# Patient Record
Sex: MALE | Race: WHITE | NOT HISPANIC OR LATINO | Employment: OTHER | URBAN - METROPOLITAN AREA
[De-identification: names, ages, dates, MRNs, and addresses within clinical notes are randomized per-mention and may not be internally consistent; named-entity substitution may affect disease eponyms.]

---

## 2018-07-21 ENCOUNTER — OFFICE VISIT (OUTPATIENT)
Dept: URGENT CARE | Facility: URGENT CARE | Age: 38
End: 2018-07-21
Payer: COMMERCIAL

## 2018-07-21 VITALS
DIASTOLIC BLOOD PRESSURE: 69 MMHG | HEART RATE: 78 BPM | HEIGHT: 74 IN | TEMPERATURE: 97.4 F | BODY MASS INDEX: 19.64 KG/M2 | SYSTOLIC BLOOD PRESSURE: 112 MMHG | WEIGHT: 153 LBS | OXYGEN SATURATION: 100 %

## 2018-07-21 DIAGNOSIS — M26.623 BILATERAL TEMPOROMANDIBULAR JOINT PAIN: Primary | ICD-10-CM

## 2018-07-21 PROCEDURE — 99202 OFFICE O/P NEW SF 15 MIN: CPT | Performed by: FAMILY MEDICINE

## 2018-07-21 NOTE — MR AVS SNAPSHOT
"              After Visit Summary   7/21/2018    Cameron Garza    MRN: [id number]           Patient Information     Date Of Birth          1980        Visit Information        Provider Department      7/21/2018 8:25 PM Jorge A Hernandez MD [facility]        Today's Diagnoses     Bilateral temporomandibular joint pain    -  1       Follow-ups after your visit        Who to contact     If you have questions or need follow up information about today's clinic visit or your schedule please contact [facility] directly at [phone].  Normal or non-critical lab and imaging results will be communicated to you by MyChart, letter or phone within 4 business days after the clinic has received the results. If you do not hear from us within 7 days, please contact the clinic through MyChart or phone. If you have a critical or abnormal lab result, we will notify you by phone as soon as possible.  Submit refill requests through [employer] or call your pharmacy and they will forward the refill request to us. Please allow 3 business days for your refill to be completed.          Additional Information About Your Visit        Care EveryWhere ID     This is your Care EveryWhere ID. This could be used by other organizations to access your [place] medical records  [id number]        Your Vitals Were     Pulse Temperature Height Pulse Oximetry BMI (Body Mass Index)       78 97.4  F (36.3  C) (Tympanic) 6' 2\" (1.88 m) 100% 19.64 kg/m2        Blood Pressure from Last 3 Encounters:   07/21/18 112/69    Weight from Last 3 Encounters:   07/21/18 153 lb (69.4 kg)              Today, you had the following     No orders found for display       Primary Care Provider Fax #    Physician No Ref-Primary [phone]       No address on file        Equal Access to Services     KAMLA NINA AH: Staci Higginbotham, alan najera, duncan rawls " lajody kohli. So [facility] [phone].    ATENCIÓN: Si habla español, tiene a dumas disposición servicios gratuitos de asistencia lingüística. Llame al [phone].    We comply with applicable federal civil rights laws and [place] laws. We do not discriminate on the basis of race, color, national origin, age, disability, sex, sexual orientation, or gender identity.            Thank you!     Thank you for choosing [facility]  for your care. Our goal is always to provide you with excellent care. Hearing back from our patients is one way we can continue to improve our services. Please take a few minutes to complete the written survey that you may receive in the mail after your visit with us. Thank you!             Your Updated Medication List - Protect others around you: Learn how to safely use, store and throw away your medicines at www.disposemymeds.org.      Notice  As of 7/21/2018  8:48 PM    You have not been prescribed any medications.

## 2018-07-22 NOTE — PROGRESS NOTES
Subjective: For most of the summer patient has been feeling pressure in his ears that waxes and wanes, worse after eating or drinking, never affects his hearing, does not have any congestion or allergy problems.  He does not drink caffeine, does not chew gum, but he is going through a divorce right now.  He works as a .  It is not terribly stressful.    Objective: ENT with totally normal TMs and throat and neck.  He does have a little discomfort over the TMJs on both sides    Assessment and plan: This is most likely TMJ related and it really does not bother him a lot, but he got worried that there might be something wrong with his ears.  We talked about some of the measures to make TMJ better and hopefully will settle down over time.

## 2019-01-03 ENCOUNTER — OFFICE VISIT (OUTPATIENT)
Dept: FAMILY MEDICINE | Facility: CLINIC | Age: 39
End: 2019-01-03
Payer: COMMERCIAL

## 2019-01-03 VITALS
TEMPERATURE: 97.5 F | OXYGEN SATURATION: 95 % | DIASTOLIC BLOOD PRESSURE: 59 MMHG | HEIGHT: 74 IN | WEIGHT: 147 LBS | HEART RATE: 85 BPM | SYSTOLIC BLOOD PRESSURE: 93 MMHG | BODY MASS INDEX: 18.87 KG/M2

## 2019-01-03 DIAGNOSIS — R05.9 COUGH: ICD-10-CM

## 2019-01-03 DIAGNOSIS — J06.9 UPPER RESPIRATORY TRACT INFECTION, UNSPECIFIED TYPE: Primary | ICD-10-CM

## 2019-01-03 PROCEDURE — 99213 OFFICE O/P EST LOW 20 MIN: CPT | Performed by: INTERNAL MEDICINE

## 2019-01-03 RX ORDER — PREDNISONE 20 MG/1
TABLET ORAL
Qty: 20 TABLET | Refills: 0 | Status: SHIPPED | OUTPATIENT
Start: 2019-01-03 | End: 2019-01-03

## 2019-01-03 RX ORDER — PREDNISONE 20 MG/1
TABLET ORAL
Qty: 20 TABLET | Refills: 0 | Status: SHIPPED | OUTPATIENT
Start: 2019-01-03 | End: 2019-03-04

## 2019-01-03 RX ORDER — AZITHROMYCIN 250 MG/1
TABLET, FILM COATED ORAL
Qty: 6 TABLET | Refills: 0 | Status: SHIPPED | OUTPATIENT
Start: 2019-01-03 | End: 2019-03-04

## 2019-01-03 RX ORDER — AZITHROMYCIN 250 MG/1
TABLET, FILM COATED ORAL
Qty: 6 TABLET | Refills: 0 | Status: SHIPPED | OUTPATIENT
Start: 2019-01-03 | End: 2019-01-03

## 2019-01-03 ASSESSMENT — MIFFLIN-ST. JEOR: SCORE: 1651.54

## 2019-01-03 NOTE — PROGRESS NOTES
SUBJECTIVE:   Cameron Garza is a 38 year old male who presents to clinic today for the following health issues:    Chief Complaint   Patient presents with     Cough     Slight yellow productive cough. Had the resp flu last week.  Last fever on Thursday. Night sweats all week., Ears ok, scratchy throat. Tried Lozenges             Current Medications:     Current Outpatient Medications   Medication Sig Dispense Refill     azithromycin (ZITHROMAX) 250 MG tablet Two tablets first day, then one tablet daily for four days. 6 tablet 0     predniSONE (DELTASONE) 20 MG tablet Take 60 mg by mouth daily for 3 days, THEN 40 mg daily for 3 days, THEN 20 mg daily for 3 days, THEN 10 mg daily for 3 days. 20 tablet 0         Allergies:      Allergies   Allergen Reactions     Amoxicillin Hives            Past Medical History:   No past medical history on file.      Past Surgical History:   No past surgical history on file.      Family Medical History:   No family history on file.      Social History:     Social History     Socioeconomic History     Marital status:      Spouse name: Not on file     Number of children: Not on file     Years of education: Not on file     Highest education level: Not on file   Social Needs     Financial resource strain: Not on file     Food insecurity - worry: Not on file     Food insecurity - inability: Not on file     Transportation needs - medical: Not on file     Transportation needs - non-medical: Not on file   Occupational History     Not on file   Tobacco Use     Smoking status: Never Smoker     Smokeless tobacco: Never Used   Substance and Sexual Activity     Alcohol use: Yes     Comment: rare      Drug use: No     Sexual activity: No   Other Topics Concern     Not on file   Social History Narrative     Not on file           Review of System:     Constitutional: Negative for fever or chills  Skin: Negative for rashes  Ears/Nose/Throat: Positive for nasal congestion, sore  "throat  Respiratory: positive for cough, URI symptoms  Cardiovascular: Negative for chest pain  Gastrointestinal: Negative for nausea, vomiting  Genitourinary: Negative for dysuria, hematuria  Musculoskeletal: Negative for myalgias  Neurologic: Negative for headaches  Psychiatric: Negative for depression, anxiety  Hematologic/Lymphatic/Immunologic: Negative  Endocrine: Negative  Behavioral: Negative for tobacco use       Physical Exam:   BP 93/59 (BP Location: Left arm, Patient Position: Chair, Cuff Size: Adult Regular)   Pulse 85   Temp 97.5  F (36.4  C) (Oral)   Ht 1.88 m (6' 2\")   Wt 66.7 kg (147 lb)   SpO2 95%   BMI 18.87 kg/m      GENERAL: alert and no distress  EYES: eyes grossly normal to inspection, and conjunctivae and sclerae normal  HENT: Normocephalic atraumatic. Nose and mouth without ulcers or lesions, nasal congestion present  NECK: supple  RESP: lungs clear to auscultation   CV: regular rate and rhythm, normal S1 S2  LYMPH: no peripheral edema   ABDOMEN: nondistended  MS: no gross musculoskeletal defects noted  SKIN: no suspicious lesions or rashes  NEURO: Alert & Oriented x 3.   PSYCH: mentation appears normal, affect normal        Diagnostic Test Results:     Diagnostic Test Results:  No results found for this or any previous visit.    ASSESSMENT/PLAN:     (R05) Cough  (J06.9) Upper respiratory tract infection, unspecified type  (primary encounter diagnosis)  Comment: recent URI with cough symptoms  Plan: azithromycin (ZITHROMAX) 250 MG tablet,         predniSONE (DELTASONE) 20 MG tablet    Follow Up Plan:     Patient is instructed to return to Internal Medicine clinic for follow-up visit in 1 week.        Shanon Hodges MD  Internal Medicine  [facility]    "

## 2019-01-04 ENCOUNTER — TELEPHONE (OUTPATIENT)
Dept: FAMILY MEDICINE | Facility: CLINIC | Age: 39
End: 2019-01-04

## 2019-01-04 NOTE — TELEPHONE ENCOUNTER
Pt just called back due to remembering quesiton from before.      Is the timing of the medication so specific?  Is it really every hour to the hour?

## 2019-01-04 NOTE — TELEPHONE ENCOUNTER
Pt is calling back about this medication.  He is very nervous and has been reading the side effects.  He is questioning immunity and whether he can travel or not?    Pt has forgotten the specifics of what questions he had.

## 2019-01-04 NOTE — TELEPHONE ENCOUNTER
Tried to call and speak with patient.      Left detailed message (permission given in first call into clinic):  1. Advised patient to contact his pharmacy regarding the Prednisone and how to take that med.  Whether it's OK to take 60mg once daily x 3 days (for ex)---or 20mg TID x 3 days??    2. Regarding travel to [place] to see his children----if patient has fever or still feeling too ill to travel, then he should probably not travel.  If patient decides not to travel and needs letter from Dr Hodges---advised patient to call back with that request.      Mary Jane ZAMORANO RN,BSN

## 2019-01-04 NOTE — TELEPHONE ENCOUNTER
Reason for Call:  Med question    Detailed comments: Pt has questions regarding predniSONE (DELTASONE) 20 MG tablet    He is wondering if he can take the 60MG at once or if he is supposed to split it up to 3x daily    He was also told to stay home but states he has court mandated  visitation with his children, is he okay to go? It is located in [place], if he is unable to go he will need a note from Dr. Hodges       Phone Number Patient can be reached at: Home number on file [phone] (home)    Best Time: any    Can we leave a detailed message on this number? YES    Call taken on 1/4/2019 at 8:34 AM by Livia Loo

## 2019-03-04 ENCOUNTER — OFFICE VISIT (OUTPATIENT)
Dept: FAMILY MEDICINE | Facility: CLINIC | Age: 39
End: 2019-03-04
Payer: COMMERCIAL

## 2019-03-04 ENCOUNTER — ANCILLARY PROCEDURE (OUTPATIENT)
Dept: GENERAL RADIOLOGY | Facility: CLINIC | Age: 39
End: 2019-03-04
Attending: INTERNAL MEDICINE
Payer: COMMERCIAL

## 2019-03-04 VITALS
BODY MASS INDEX: 20.95 KG/M2 | WEIGHT: 158.1 LBS | DIASTOLIC BLOOD PRESSURE: 72 MMHG | SYSTOLIC BLOOD PRESSURE: 111 MMHG | TEMPERATURE: 97.3 F | HEIGHT: 73 IN | HEART RATE: 67 BPM

## 2019-03-04 DIAGNOSIS — R05.9 COUGH: ICD-10-CM

## 2019-03-04 DIAGNOSIS — J06.9 UPPER RESPIRATORY TRACT INFECTION, UNSPECIFIED TYPE: ICD-10-CM

## 2019-03-04 DIAGNOSIS — J20.9 ACUTE BRONCHITIS, UNSPECIFIED ORGANISM: Primary | ICD-10-CM

## 2019-03-04 PROCEDURE — 71046 X-RAY EXAM CHEST 2 VIEWS: CPT

## 2019-03-04 PROCEDURE — 99214 OFFICE O/P EST MOD 30 MIN: CPT | Performed by: INTERNAL MEDICINE

## 2019-03-04 RX ORDER — PREDNISONE 20 MG/1
TABLET ORAL
Qty: 20 TABLET | Refills: 0 | Status: SHIPPED | OUTPATIENT
Start: 2019-03-04 | End: 2019-03-04

## 2019-03-04 RX ORDER — AZITHROMYCIN 250 MG/1
TABLET, FILM COATED ORAL
Qty: 6 TABLET | Refills: 1 | Status: SHIPPED | OUTPATIENT
Start: 2019-03-04 | End: 2019-05-09

## 2019-03-04 RX ORDER — PREDNISONE 20 MG/1
TABLET ORAL
Qty: 20 TABLET | Refills: 1 | Status: SHIPPED | OUTPATIENT
Start: 2019-03-04 | End: 2019-05-09

## 2019-03-04 ASSESSMENT — MIFFLIN-ST. JEOR: SCORE: 1686.02

## 2019-03-04 NOTE — PROGRESS NOTES
SUBJECTIVE:   Cameron Garza is a 38 year old male who presents to clinic today for the following health issues:      Follow up on recent Cough, bronchitis symptoms      HPI:   Patient Cameron Garza is a very pleasant 38 year old male with history of recent cough and bronchitis who presents to Internal Medicine clinic today for follow up on recent Cough, bronchitis symptoms. Regarding the patient's bronchitis symptoms, the patient denies any fever or chills. He is still complaining of intermittent dry coughing spells symptoms.        Current Medications:     Current Outpatient Medications   Medication Sig Dispense Refill     azithromycin (ZITHROMAX) 250 MG tablet Two tablets first day, then one tablet daily for four days. 6 tablet 0         Allergies:      Allergies   Allergen Reactions     Amoxicillin Hives            Past Medical History:   No past medical history on file.      Past Surgical History:   No past surgical history on file.      Family Medical History:   No family history on file.      Social History:     Social History     Socioeconomic History     Marital status:      Spouse name: Not on file     Number of children: Not on file     Years of education: Not on file     Highest education level: Not on file   Occupational History     Not on file   Social Needs     Financial resource strain: Not on file     Food insecurity:     Worry: Not on file     Inability: Not on file     Transportation needs:     Medical: Not on file     Non-medical: Not on file   Tobacco Use     Smoking status: Never Smoker     Smokeless tobacco: Never Used   Substance and Sexual Activity     Alcohol use: Yes     Comment: rare      Drug use: No     Sexual activity: No   Lifestyle     Physical activity:     Days per week: Not on file     Minutes per session: Not on file     Stress: Not on file   Relationships     Social connections:     Talks on phone: Not on file     Gets together: Not on file     Attends Buddhist service:  "Not on file     Active member of club or organization: Not on file     Attends meetings of clubs or organizations: Not on file     Relationship status: Not on file     Intimate partner violence:     Fear of current or ex partner: Not on file     Emotionally abused: Not on file     Physically abused: Not on file     Forced sexual activity: Not on file   Other Topics Concern     Not on file   Social History Narrative     Not on file           Review of System:     Constitutional: Negative for fever or chills  Skin: Negative for rashes  Ears/Nose/Throat: Negative for nasal congestion, sore throat  Respiratory: positive for cough  Cardiovascular: Negative for chest pain  Gastrointestinal: Negative for nausea, vomiting  Genitourinary: Negative for dysuria, hematuria  Musculoskeletal: Negative for myalgias  Neurologic: Negative for headaches  Psychiatric: Negative for depression, anxiety  Hematologic/Lymphatic/Immunologic: Negative  Endocrine: Negative  Behavioral: Negative for tobacco use       Physical Exam:   /72 (BP Location: Right arm, Cuff Size: Adult Regular)   Pulse 67   Temp 97.3  F (36.3  C) (Oral)   Ht 1.854 m (6' 1\")   Wt 71.7 kg (158 lb 1.6 oz)   BMI 20.86 kg/m      GENERAL: alert and no distress  EYES: eyes grossly normal to inspection, and conjunctivae and sclerae normal  HENT: Normocephalic atraumatic. Nose and mouth without ulcers or lesions  NECK: supple  RESP: intermittent dry coughing spells present  CV: regular rate and rhythm, normal S1 S2  LYMPH: no peripheral edema   ABDOMEN: nondistended  MS: no gross musculoskeletal defects noted  SKIN: no suspicious lesions or rashes  NEURO: Alert & Oriented x 3.   PSYCH: mentation appears normal, affect normal        Diagnostic Test Results:     XR CHEST 2 VW 3/4/2019 11:45 AM      HISTORY: cough, eval lungs; Cough     COMPARISON: None                                                                      IMPRESSION: The cardiac silhouette and " pulmonary vasculature are  within normal limits. The lungs are clear.     OTTO DENNEY MD    ASSESSMENT/PLAN:     (R05) Cough  (J06.9) Upper respiratory tract infection, unspecified type  (J20.9) Acute bronchitis, unspecified organism  (primary encounter diagnosis)  Comment: bronchitis with cough symptoms not fully resolved   Plan: XR Chest 2 Views is negative for pneumonia, predniSONE (DELTASONE) 20 MG         tablet, azithromycin (ZITHROMAX) 250 MG tablet          Follow Up Plan:     Patient is instructed to return to Internal Medicine clinic for follow-up visit in 1 week.        Shanon Hodges MD  Internal Medicine  [facility]

## 2019-05-09 ENCOUNTER — OFFICE VISIT (OUTPATIENT)
Dept: FAMILY MEDICINE | Facility: CLINIC | Age: 39
End: 2019-05-09
Payer: COMMERCIAL

## 2019-05-09 VITALS
BODY MASS INDEX: 20.54 KG/M2 | OXYGEN SATURATION: 98 % | HEART RATE: 84 BPM | DIASTOLIC BLOOD PRESSURE: 66 MMHG | TEMPERATURE: 97.4 F | WEIGHT: 155 LBS | HEIGHT: 73 IN | SYSTOLIC BLOOD PRESSURE: 106 MMHG

## 2019-05-09 DIAGNOSIS — B35.1 TOENAIL FUNGUS: ICD-10-CM

## 2019-05-09 DIAGNOSIS — L60.0 INGROWN TOENAIL OF LEFT FOOT: Primary | ICD-10-CM

## 2019-05-09 DIAGNOSIS — B35.3 TINEA PEDIS OF LEFT FOOT: ICD-10-CM

## 2019-05-09 PROCEDURE — 99213 OFFICE O/P EST LOW 20 MIN: CPT | Performed by: NURSE PRACTITIONER

## 2019-05-09 ASSESSMENT — MIFFLIN-ST. JEOR: SCORE: 1671.96

## 2019-05-09 NOTE — PROGRESS NOTES
"HPI      SUBJECTIVE:   Cameron Garza is a 38 year old male who presents to clinic today for the following   health issues:    Chief Complaint   Patient presents with     Toe Pain     left big toe pain, Possible ingrown toenail. Red and tender to touch. Past hx toenail injury        L great Toenail fell off 1 year ago.   Started with ingrown about that time  Now is painful, red and swollen  No drainage   No history of ingrown nails   Always has bad feet he reports. Has chronic ridges in nails that are very pronounced.   Has a chronic rash of feet as well       No past medical history on file.  No family history on file.  No past surgical history on file.  Social History     Tobacco Use     Smoking status: Never Smoker     Smokeless tobacco: Never Used   Substance Use Topics     Alcohol use: Yes     Comment: rare      No current outpatient medications on file.     Allergies   Allergen Reactions     Amoxicillin Hives       Reviewed and updated as needed this visit by clinical staff and provider     ROS  Detailed as above     /66 (BP Location: Left arm, Patient Position: Chair, Cuff Size: Adult Regular)   Pulse 84   Temp 97.4  F (36.3  C) (Tympanic)   Ht 1.854 m (6' 1\")   Wt 70.3 kg (155 lb)   SpO2 98%   BMI 20.45 kg/m     Physical Exam   Constitutional: He appears well-developed.   Pulmonary/Chest: Effort normal.   Neurological: He is alert.   Skin:   Left great toenial thick with many ridges. Partially ingrown of lateral aspect with adjacent erythema, swelling and tenderness.  PLantar L foot with patchy red/dry/excoriated rash   Psychiatric: He has a normal mood and affect. Judgment normal.       Assessment and Plan:       ICD-10-CM    1. Ingrown toenail of left foot L60.0 PODIATRY/FOOT & ANKLE SURGERY REFERRAL   2. Toenail fungus B35.1    3. Tinea pedis of left foot B35.3      Ingrown toenail with fungus. Has many ridges in nails. States his kids also have a lot of ridges in their nails. Instructed on " using vicks vaporub on nails for fungus. He will follow-up with podiatry for eval of ingrown nail. Use lotrimin or any  Athletes foot OTC treatment.       MITESH Duarte, CNP  [facility]

## 2019-05-17 ENCOUNTER — OFFICE VISIT (OUTPATIENT)
Dept: PODIATRY | Facility: CLINIC | Age: 39
End: 2019-05-17
Payer: COMMERCIAL

## 2019-05-17 VITALS
HEIGHT: 73 IN | WEIGHT: 155 LBS | DIASTOLIC BLOOD PRESSURE: 66 MMHG | SYSTOLIC BLOOD PRESSURE: 106 MMHG | BODY MASS INDEX: 20.54 KG/M2

## 2019-05-17 DIAGNOSIS — L60.3 ONYCHODYSTROPHY: Primary | ICD-10-CM

## 2019-05-17 DIAGNOSIS — B35.3 TINEA PEDIS OF BOTH FEET: ICD-10-CM

## 2019-05-17 DIAGNOSIS — L60.0 INGROWING NAIL: ICD-10-CM

## 2019-05-17 PROCEDURE — 99243 OFF/OP CNSLTJ NEW/EST LOW 30: CPT | Performed by: PODIATRIST

## 2019-05-17 ASSESSMENT — MIFFLIN-ST. JEOR: SCORE: 1671.96

## 2019-05-17 NOTE — PROGRESS NOTES
PATIENT HISTORY:  Cameron Garza is a 38 year old male who presents to clinic for L 1st toenail pain, minimal today.  Reports hx of recent nail injury, and nail has grown back thicker, discolored.  3 weeks of pain.  He has tried Vicks on the nail.  5-6/10 pain with pressure.  None at rest.      I was requested to see this patient for this issue by Aleah Alvarado CNP.      Review of Systems:  Patient denies fever, chills, rash, wound, stiffness, limping, numbness, weakness, heart burn, blood in stool, chest pain with activity, calf pain when walking, shortness of breath with activity, chronic cough, easy bleeding/bruising, swelling of ankles, excessive thirst, fatigue, depression, anxiety.       PAST MEDICAL HISTORY:  No pertinent past medical history.     PAST SURGICAL HISTORY:  No pertinent past surgical history.     MEDICATIONS: No current outpatient medications on file.     ALLERGIES:    Allergies   Allergen Reactions     Amoxicillin Hives        SOCIAL HISTORY:   Social History     Socioeconomic History     Marital status:      Spouse name: Not on file     Number of children: Not on file     Years of education: Not on file     Highest education level: Not on file   Occupational History     Not on file   Social Needs     Financial resource strain: Not on file     Food insecurity:     Worry: Not on file     Inability: Not on file     Transportation needs:     Medical: Not on file     Non-medical: Not on file   Tobacco Use     Smoking status: Never Smoker     Smokeless tobacco: Never Used   Substance and Sexual Activity     Alcohol use: Yes     Comment: rare      Drug use: No     Sexual activity: Never   Lifestyle     Physical activity:     Days per week: Not on file     Minutes per session: Not on file     Stress: Not on file   Relationships     Social connections:     Talks on phone: Not on file     Gets together: Not on file     Attends Restorationist service: Not on file     Active member of club or  "organization: Not on file     Attends meetings of clubs or organizations: Not on file     Relationship status: Not on file     Intimate partner violence:     Fear of current or ex partner: Not on file     Emotionally abused: Not on file     Physically abused: Not on file     Forced sexual activity: Not on file   Other Topics Concern     Not on file   Social History Narrative     Not on file        FAMILY HISTORY: No pertinent family history.     EXAM:Vitals: /66   Ht 1.854 m (6' 1\")   Wt 70.3 kg (155 lb)   BMI 20.45 kg/m    BMI= Body mass index is 20.45 kg/m .    General appearance: Patient is alert and fully cooperative with history & exam.  No sign of distress is noted during the visit.     Psychiatric: Affect is pleasant & appropriate.  Patient appears motivated to improve health.     Respiratory: Breathing is regular & unlabored while sitting.     HEENT: Hearing is intact to spoken word.  Speech is clear.  No gross evidence of visual impairment that would impact ambulation.     Dermatologic: L 1st toenail with discoloration, dystrophic changes.  Mild pain and incurvation at lateral border.  R 3rd toe with similar appearance (pt reports prior injury here as well).  Some mild thickening to lateral edge of R 5th toenail with small callus.  Skin is intact to both lower extremities without significant lesions, rash or abrasion.  No paronychia or evidence of soft tissue infection is noted.  Mild plantar foot skin scaling rash b/l.     Vascular: DP & PT pulses are intact & regular bilaterally.  No significant edema or varicosities noted.  CFT and skin temperature are normal to both lower extremities.     Neurologic: Lower extremity sensation is intact to light touch.  No evidence of weakness or contracture in the lower extremities.  No evidence of neuropathy.     Musculoskeletal: Adductovarus R 5th toe.  Patient is ambulatory without assistive device or brace.  No gross ankle deformity noted.  No foot or ankle " joint effusion is noted.     ASSESSMENT:   Onychodystrophy b/l  L hallux ingrowing nail  Tinea pedis.  Toe deformity     PLAN:  Reviewed patient's chart in epic.  Discussed condition and treatment options including pros and cons.    The potential causes and nature of an ingrown toenail were discussed with the patient.  We reviewed the natural history/prognosis of the condition and potential risks if no treatment is provided.      Treatment options discussed included conservative management (soaking of foot, adequate width shoes)  as well as surgical management (partial or total nail removal).  The pros and cons of both forms of treatment were reviewed.      After thorough discussion and answering all questions, the patient elected to try wider shoes, soaking.  Minimal pain today w/o infection.  Nail procedure offered, but pt deferred.     Nail changes likely permanent from trauma.  Fungus is possible.    Discussed causes of nail fungus.  Discussed treatment options with patient and explained that there isn't one treatment that is 100% effective.  Debridement is an option.  Discussed oral lamisil which is the most effective at about 70% but can have liver effects.  Discussed over the counter antifungal creams.  Explained that these are less effective and need to be applied twice a day for about 3-4 months.  Also talked about prescription topicals, which have similar efficacy.  Also discussed that if there was damage to the nail and the nail is now dystrophic that none of the above is going to change the nail.  Discussed that if it becomes more painful, we can remove the nail in clinic.      Lamisil AT otc advised for athlete's foot.    Handouts given.    F/u prn.       Cameron Barnhart DPM, FACFAS

## 2019-05-17 NOTE — PATIENT INSTRUCTIONS
Try Lamisil AT cream over the counter for athlete's foot    INGROWN TOENAIL  When a toenail is ingrown, it is curved and grows into the skin, usually at the nail borders (the sides of the nail). This  digging in  of the nail irritates the skin, often creating pain, redness, swelling, and warmth in the toe.  If an ingrown nail causes a break in the skin, bacteria may enter and cause an infection in the area, which is often marked by drainage and a foul odor. However, even if the toe isn t painful, red, swollen, or warm, a nail that curves downward into the skin can progress to an infection.  CAUSES  Causes of ingrown toenails include:  Heredity. In many people, the tendency for ingrown toenails is inherited.   Trauma. Sometimes an ingrown toenail is the result of trauma, such as stubbing your toe, having an object fall on your toe, or engaging in activities that involve repeated pressure on the toes, such as kicking or running.   Improper trimming. The most common cause of ingrown toenails is cutting your nails too short. This encourages the skin next to the nail to fold over the nail.   Improperly sized footwear. Ingrown toenails can result from wearing socks and shoes that are tight or short.   Nail Conditions. Ingrown toenails can be caused by nail problems, such as fungal infections or losing a nail due to trauma.   TREATMENT  Sometimes initial treatment for ingrown toenails can be safely performed at home. However, home treatment is strongly discouraged if an infection is suspected, or for those who have medical conditions that put feet at high risk, such as diabetes, nerve damage in the foot, or poor circulation.  Home care:  If you don t have an infection or any of the above medical conditions, you can soak your foot in room-temperature water (adding Epsom s salt may be recommended by your doctor), and gently massage the side of the nail fold to help reduce the inflammation.  Avoid attempting  bathroom  surgery.  Repeated cutting of the nail can cause the condition to worsen over time. If your symptoms fail to improve, it s time to see a foot and ankle surgeon.  Physician care:  After examining the toe, the foot and ankle surgeon will select the treatment best suited for you. If an infection is present, an oral antibiotic may be prescribed.  Sometimes a minor surgical procedure, often performed in the office, will ease the pain and remove the offending nail. After applying a local anesthetic, the doctor removes part of the nail s side border. Some nails may become ingrown again, requiring removal of the nail root.  Following the nail procedure, a light bandage will be applied. Most people experience very little pain after surgery and may resume normal activity the next day. If your surgeon has prescribed an oral antibiotic, be sure to take all the medication, even if your symptoms have improved.    PREVENTION  Many cases of ingrown toenails may be prevented by:  Proper trimming. Cut toenails in a fairly straight line, and don t cut them too short. You should be able to get your fingernail under the sides and end of the nail.   Well-fitted shoes and socks. Don t wear shoes that are short or tight in the toe area. Avoid shoes that are loose, because they too cause pressure on the toes, especially when running or walking briskly.     SOAKING INSTRUCTIONS   - Twice a day soak/wash the affected area in warm water (you may add Epsom salt) for 5 to 10 minutes.  - After soaks, pat the area dry and then allow to airdry for a few minutes.   - Apply antibiotic ointment to the area and cover with 2 x 2 gauze and paper tape or a band-aid.  - You may walk and pursue everyday activities as tolerated with either an open toe shoe or cut-out shoe as needed. Wear regular shoes if no pain is noted.  - Watch for any signs and symptoms of infection such as: redness, red streaks going up the foot/leg, swelling, pus or foul odor.          NAIL FUNGUS / ONYCHOMYCOSIS   Nail fungus is not a hygiene problem and will not likely lead to significant medical   problems. The nails may get thick causing pain and possibly local skin infection.   Treatments include debridement (trimming), oral antifungals, topical antifungals and complete removal of the nail. Most fungal nails are not treated.   Topicals such as tea tree oil can be helpful for surface fungus and may, at best, limit   progression. Over the counter creams (such as Lamisil) can also be used however, their effectiveness is also quite low.  Topical treatment with Pen lac is expensive and often not covered by insurance. Pen lac has an approximate 8% success rate. Topical therapy recommendations is to apply twice a day for at least 3-4 months as it takes 9 months for new nail to grow out.    Experts suggest soaking your feet for 15 to 20 minutes in a mixture of 1 cup vinegar to 4 cups warm water. Be sure to rinse well and pat your feet dry when you're done. You can soak your feet like this daily. But if your skin becomes irritated, try soaking only two to three times a week. Vicks VapoRub, as with vinegar, there have been no controlled clinical trials to assess the effectiveness of Vicks VapoRub on nail fungus, but there have been numerous anecdotal reports that it works. There's no consensus on how often to apply this product, so check with your doctor before using it on your nails.     Oral therapies include Sporanox and Lamisil. Oral therapies are also expensive and not very effective. Side effects such as liver disease are the main concern. Return of fungus is common even if the treatment worked.     Other Tips:  - Penlac nail medication apply daily x 4 months; remove old polish first day of each week  - Antifungal cream/powder (Zeasorb) - apply daily to feet and shoes x 2 months  - Clean shoes with Lysol or in washing machine every few weeks  - Rotate shoe gear; give them 24 hours to dry  out between days wearing them  - Clean pair of socks in morning, clean pair in afternoon if your feet sweat  - Shower shoes used in public showers/pools        ATHLETE'S FOOT (TINEA PEDIS)  It is a rash that is caused by a fungus (most commonly Dermatophytes) in the outer layer of skin on the foot or in the nails. It can occur between the toes or on the instep and heel areas of the feet. Fungus will grow in warm and moist environments. The fungus that causes athlete's foot is contagious and you can get it from touching someone who has it of walking around bare foot around swimming pools, gyms, saunas, communal baths and showers, fitness centers or locker rooms.     SYMPTOMS  The symptoms of athlete's foot are numerous and include; itching, burning or stinging between the toes or on the soles of the feet, blisters, cracked and peeling skin, excessive dryness or excessive scaling on the bottom or sides of the feet, redness and softness with breaking down of the skin, especially between the toes, foot odor and thick, crumbly nails. Older males or people who live in warm humid environments are more prone to get it but it can develop at any age.    TREATMENT OPTIONS  Typically it can be treated with antifungal creams, lotions, ointments, sprays, or gels.  Many are available over the counter, some are prescription. It is important that you use them long enough, typically 4 weeks, to clear the rash. If an infection is particularly bad, your provider may prescribe oral medication. It is important that you follow the directions for any medication carefully to prevent recurrence of the rash.    HOME TREATMENT  1.  Keep feet clean and dry  2.  Dry between your toes any time your feet become wet  3.  Wear socks that are made of cotton, wool, or fibers designed to wick moisture away  from skin. Nylon, lycra, and spandex tend to trap moisture.  4.  If you have blistering, you may soak your feet in Burow's solution (aluminum  acetate is active ingredient. Domeboro is a brand sold at [employer]. This is available over the counter.  5.  Treat shows with antifungal powder  6.  Tea Tree oil may help reduce symptoms but does not cure the rash.    PREVENTION  1.  Change socks or stockings regularly.  2.  Use talcum powder on your feet if they sweat a lot.  3.  Do not borrow shoes.  4.  Let shoes dry out for 24 hours before wearing them again.  5.  Treat shoes with fungal powder  6.  Wear shoes that are well ventilated such as leather shoes or sandals.  Avoid shoes  made of synthetic materials such as vinyl or rubber.  7.  Wear water proof sandals when you are in public areas such as locker rooms, pools, communal baths, showers, saunas, and fitness centers.    FYI: Call or seek medical attention IMMEDIATELY if:  - You develop a fever over 100.4F for more than 24 hrs.  - There is a discharge of pus from infected areas.  - Red streaks develop from the affected areas  - Increase in redness, warmth, pain, swelling, or tenderness in the affected areas.

## 2019-05-17 NOTE — LETTER
5/17/2019         RE: Cameron Garza  [address]  [facility] [address]        Dear Colleague,    Thank you for referring your patient, Cameron Garza, to the [facility]. Please see a copy of my visit note below.    PATIENT HISTORY:  Cameron Garza is a 38 year old male who presents to clinic for L 1st toenail pain, minimal today.  Reports hx of recent nail injury, and nail has grown back thicker, discolored.  3 weeks of pain.  He has tried Vicks on the nail.  5-6/10 pain with pressure.  None at rest.      I was requested to see this patient for this issue by Aleah Alvarado CNP.      Review of Systems:  Patient denies fever, chills, rash, wound, stiffness, limping, numbness, weakness, heart burn, blood in stool, chest pain with activity, calf pain when walking, shortness of breath with activity, chronic cough, easy bleeding/bruising, swelling of ankles, excessive thirst, fatigue, depression, anxiety.       PAST MEDICAL HISTORY:  No pertinent past medical history.     PAST SURGICAL HISTORY:  No pertinent past surgical history.     MEDICATIONS: No current outpatient medications on file.     ALLERGIES:    Allergies   Allergen Reactions     Amoxicillin Hives        SOCIAL HISTORY:   Social History     Socioeconomic History     Marital status:      Spouse name: Not on file     Number of children: Not on file     Years of education: Not on file     Highest education level: Not on file   Occupational History     Not on file   Social Needs     Financial resource strain: Not on file     Food insecurity:     Worry: Not on file     Inability: Not on file     Transportation needs:     Medical: Not on file     Non-medical: Not on file   Tobacco Use     Smoking status: Never Smoker     Smokeless tobacco: Never Used   Substance and Sexual Activity     Alcohol use: Yes     Comment: rare      Drug use: No     Sexual activity: Never   Lifestyle     Physical activity:     Days per week: Not on file      "Minutes per session: Not on file     Stress: Not on file   Relationships     Social connections:     Talks on phone: Not on file     Gets together: Not on file     Attends Yazdanism service: Not on file     Active member of club or organization: Not on file     Attends meetings of clubs or organizations: Not on file     Relationship status: Not on file     Intimate partner violence:     Fear of current or ex partner: Not on file     Emotionally abused: Not on file     Physically abused: Not on file     Forced sexual activity: Not on file   Other Topics Concern     Not on file   Social History Narrative     Not on file        FAMILY HISTORY: No pertinent family history.     EXAM:Vitals: /66   Ht 1.854 m (6' 1\")   Wt 70.3 kg (155 lb)   BMI 20.45 kg/m     BMI= Body mass index is 20.45 kg/m .    General appearance: Patient is alert and fully cooperative with history & exam.  No sign of distress is noted during the visit.     Psychiatric: Affect is pleasant & appropriate.  Patient appears motivated to improve health.     Respiratory: Breathing is regular & unlabored while sitting.     HEENT: Hearing is intact to spoken word.  Speech is clear.  No gross evidence of visual impairment that would impact ambulation.     Dermatologic: L 1st toenail with discoloration, dystrophic changes.  Mild pain and incurvation at lateral border.  R 3rd toe with similar appearance (pt reports prior injury here as well).  Some mild thickening to lateral edge of R 5th toenail with small callus.  Skin is intact to both lower extremities without significant lesions, rash or abrasion.  No paronychia or evidence of soft tissue infection is noted.  Mild plantar foot skin scaling rash b/l.     Vascular: DP & PT pulses are intact & regular bilaterally.  No significant edema or varicosities noted.  CFT and skin temperature are normal to both lower extremities.     Neurologic: Lower extremity sensation is intact to light touch.  No evidence " of weakness or contracture in the lower extremities.  No evidence of neuropathy.     Musculoskeletal: Adductovarus R 5th toe.  Patient is ambulatory without assistive device or brace.  No gross ankle deformity noted.  No foot or ankle joint effusion is noted.     ASSESSMENT:   Onychodystrophy b/l  L hallux ingrowing nail  Tinea pedis.  Toe deformity     PLAN:  Reviewed patient's chart in epic.  Discussed condition and treatment options including pros and cons.    The potential causes and nature of an ingrown toenail were discussed with the patient.  We reviewed the natural history/prognosis of the condition and potential risks if no treatment is provided.      Treatment options discussed included conservative management (soaking of foot, adequate width shoes)  as well as surgical management (partial or total nail removal).  The pros and cons of both forms of treatment were reviewed.      After thorough discussion and answering all questions, the patient elected to try wider shoes, soaking.  Minimal pain today w/o infection.  Nail procedure offered, but pt deferred.     Nail changes likely permanent from trauma.  Fungus is possible.    Discussed causes of nail fungus.  Discussed treatment options with patient and explained that there isn't one treatment that is 100% effective.  Debridement is an option.  Discussed oral lamisil which is the most effective at about 70% but can have liver effects.  Discussed over the counter antifungal creams.  Explained that these are less effective and need to be applied twice a day for about 3-4 months.  Also talked about prescription topicals, which have similar efficacy.  Also discussed that if there was damage to the nail and the nail is now dystrophic that none of the above is going to change the nail.  Discussed that if it becomes more painful, we can remove the nail in clinic.      Lamisil AT otc advised for athlete's foot.    Handouts given.    F/u prn.       Cameron ROSA  RADHA Barnhart, FACFAS          Again, thank you for allowing me to participate in the care of your patient.        Sincerely,        Cameron Barnhart DPM

## 2019-09-07 ENCOUNTER — OFFICE VISIT (OUTPATIENT)
Dept: URGENT CARE | Facility: URGENT CARE | Age: 39
End: 2019-09-07
Payer: COMMERCIAL

## 2019-09-07 VITALS
SYSTOLIC BLOOD PRESSURE: 117 MMHG | BODY MASS INDEX: 20.45 KG/M2 | WEIGHT: 155 LBS | TEMPERATURE: 97.8 F | OXYGEN SATURATION: 100 % | DIASTOLIC BLOOD PRESSURE: 72 MMHG | HEART RATE: 67 BPM

## 2019-09-07 DIAGNOSIS — Z11.3 SCREEN FOR STD (SEXUALLY TRANSMITTED DISEASE): Primary | ICD-10-CM

## 2019-09-07 PROCEDURE — 99213 OFFICE O/P EST LOW 20 MIN: CPT | Performed by: NURSE PRACTITIONER

## 2019-09-07 PROCEDURE — 87491 CHLMYD TRACH DNA AMP PROBE: CPT | Performed by: NURSE PRACTITIONER

## 2019-09-07 PROCEDURE — 87591 N.GONORRHOEAE DNA AMP PROB: CPT | Performed by: NURSE PRACTITIONER

## 2019-09-07 ASSESSMENT — ENCOUNTER SYMPTOMS
RESPIRATORY NEGATIVE: 1
CARDIOVASCULAR NEGATIVE: 1
GASTROINTESTINAL NEGATIVE: 1
CONSTITUTIONAL NEGATIVE: 1

## 2019-09-07 NOTE — PROGRESS NOTES
HPI  Cameron Garza 38 year old presents to the urgent care requesting gonorrhea and Chlamydia testing.  He states that his soon-to-be ex-wife has made accusations regarding sexually transmitted infections in his children and on the advice of his  has come for testing.  He states he has not been sexually active and has no genuine concerns that he is contacted an STI.    History reviewed. No pertinent past medical history.   There is no problem list on file for this patient.   History reviewed. No pertinent surgical history.   Allergies   Allergen Reactions     Amoxicillin Hives         Review of Systems   Constitutional: Negative.    Respiratory: Negative.    Cardiovascular: Negative.    Gastrointestinal: Negative.    Genitourinary: Negative.          Physical Exam   Constitutional:   /72   Pulse 67   Temp 97.8  F (36.6  C) (Oral)   Wt 70.3 kg (155 lb)   SpO2 100%   BMI 20.45 kg/m       Cardiovascular: Normal rate.   Pulmonary/Chest: Effort normal.   Genitourinary:   Genitourinary Comments: Negative for symptoms of an STI   Nursing note and vitals reviewed.    Assessment:  1. Screen for STD (sexually transmitted disease)        Plan:  Results will be available in [facility] in 2 days.   Call if you have questions/concerns.   Viky Gore, DNP, APRN, CNP

## 2019-09-10 LAB
C TRACH DNA SPEC QL NAA+PROBE: NEGATIVE
N GONORRHOEA DNA SPEC QL NAA+PROBE: NEGATIVE
SPECIMEN SOURCE: NORMAL
SPECIMEN SOURCE: NORMAL

## 2019-10-17 ENCOUNTER — OFFICE VISIT (OUTPATIENT)
Dept: FAMILY MEDICINE | Facility: CLINIC | Age: 39
End: 2019-10-17
Payer: COMMERCIAL

## 2019-10-17 VITALS
TEMPERATURE: 97 F | DIASTOLIC BLOOD PRESSURE: 59 MMHG | BODY MASS INDEX: 20.41 KG/M2 | OXYGEN SATURATION: 99 % | WEIGHT: 154 LBS | SYSTOLIC BLOOD PRESSURE: 104 MMHG | HEIGHT: 73 IN | HEART RATE: 63 BPM

## 2019-10-17 DIAGNOSIS — D64.9 ANEMIA, UNSPECIFIED TYPE: ICD-10-CM

## 2019-10-17 DIAGNOSIS — Z13.29 SCREENING FOR THYROID DISORDER: ICD-10-CM

## 2019-10-17 DIAGNOSIS — F41.9 ANXIETY: ICD-10-CM

## 2019-10-17 DIAGNOSIS — Z80.0 FAMILY HISTORY OF COLON CANCER: ICD-10-CM

## 2019-10-17 DIAGNOSIS — R79.89 ELEVATED SERUM CREATININE: Primary | ICD-10-CM

## 2019-10-17 DIAGNOSIS — Z13.220 SCREENING FOR LIPID DISORDERS: ICD-10-CM

## 2019-10-17 DIAGNOSIS — R20.2 PARESTHESIA: ICD-10-CM

## 2019-10-17 DIAGNOSIS — K11.1 PAROTID GLAND ENLARGEMENT: ICD-10-CM

## 2019-10-17 LAB
ERYTHROCYTE [DISTWIDTH] IN BLOOD BY AUTOMATED COUNT: 13.2 % (ref 10–15)
HCT VFR BLD AUTO: 42.1 % (ref 40–53)
HGB BLD-MCNC: 14.1 G/DL (ref 13.3–17.7)
MCH RBC QN AUTO: 29.9 PG (ref 26.5–33)
MCHC RBC AUTO-ENTMCNC: 33.5 G/DL (ref 31.5–36.5)
MCV RBC AUTO: 89 FL (ref 78–100)
PLATELET # BLD AUTO: 190 10E9/L (ref 150–450)
RBC # BLD AUTO: 4.72 10E12/L (ref 4.4–5.9)
VIT B12 SERPL-MCNC: 507 PG/ML (ref 193–986)
WBC # BLD AUTO: 5.2 10E9/L (ref 4–11)

## 2019-10-17 PROCEDURE — 85027 COMPLETE CBC AUTOMATED: CPT | Performed by: INTERNAL MEDICINE

## 2019-10-17 PROCEDURE — 82607 VITAMIN B-12: CPT | Performed by: INTERNAL MEDICINE

## 2019-10-17 PROCEDURE — 99214 OFFICE O/P EST MOD 30 MIN: CPT | Performed by: INTERNAL MEDICINE

## 2019-10-17 PROCEDURE — 84443 ASSAY THYROID STIM HORMONE: CPT | Performed by: INTERNAL MEDICINE

## 2019-10-17 PROCEDURE — 82728 ASSAY OF FERRITIN: CPT | Performed by: INTERNAL MEDICINE

## 2019-10-17 PROCEDURE — 36415 COLL VENOUS BLD VENIPUNCTURE: CPT | Performed by: INTERNAL MEDICINE

## 2019-10-17 PROCEDURE — 80048 BASIC METABOLIC PNL TOTAL CA: CPT | Performed by: INTERNAL MEDICINE

## 2019-10-17 ASSESSMENT — MIFFLIN-ST. JEOR: SCORE: 1667.42

## 2019-10-17 NOTE — PROGRESS NOTES
Subjective     Cameron Garza is a 38 year old male who presents to clinic today for the following health issues:    HPI   ED/UC Followup:    Facility:  [facility]  Date of visit: 09/23/2019  Reason for visit:     Paresthesias (Primary Dx);   Anxiety;   Elevated serum creatinine    Current Status: patient states he is doing a lot better since discharge. Patient is asking for some blood work done as well.     Patient is here for ED follow-up   Was seen on 09/23/2019 for paraesthesias and anxiety  He woke up in middle of night with shakiness and tingling in arms  He also had headache and body was sore  This has happened 3 times  This is new issue  Only noticed these symptoms for last 2-3 months  Each time has same symptoms  Has not had any symptoms for last 2-3 weeks  Never been on anxiety medications in past  Never had cardiac work up done  Denies family history of anxiety and depression  Denies palpitations    Elevated creatinine  He was also found to have elevated creatinine in ED  There was chance he was dehydrated during this time  Would like to recheck labs today    Family history of colon cancer  Mother was diagnosed with colon cancer in 2010  She was 60 when she was diagnosed  Was told he needs colonoscopy at age 39  He will be due for one after 12/13/2019    Medications and Labs reviewed in EPIC    Reviewed and updated as needed this visit by Provider         Review of Systems   ROS COMP: Constitutional, HEENT, cardiovascular, pulmonary, GI, , musculoskeletal, neuro, skin, endocrine and psych systems are negative, except as otherwise noted.    This document serves as a record of the services and decisions personally performed and made by Britany Meadows MD. It was created on her behalf by Aimee Rahman, a trained medical scribe. The creation of this document is based on the provider's statements to the medical scribe.  Aimee Rahman 4:05 PM October 17, 2019        Objective    /59 (BP  "Location: Right arm, Patient Position: Sitting, Cuff Size: Adult Regular)   Pulse 63   Temp 97  F (36.1  C) (Tympanic)   Ht 1.854 m (6' 1\")   Wt 69.9 kg (154 lb)   SpO2 99%   BMI 20.32 kg/m    Body mass index is 20.32 kg/m .  Physical Exam   GENERAL APPEARANCE: healthy, alert and no distress  EYES: Eyes grossly normal to inspection, PERRL and conjunctivae and sclerae normal  HENT: ear canals and TM's normal and nose and mouth without ulcers or lesions  NECK: enlarged parotid glands bilaterally, no adenopathy  RESP: lungs clear to auscultation - no rales, rhonchi or wheezes  CV: regular rates and rhythm, normal S1 S2, no S3  PSYCH: mentation appears normal, affect normal/bright    Diagnostic Test Results:  Labs reviewed in Epic  Results for orders placed or performed in visit on 10/17/19 (from the past 24 hour(s))   CBC with platelets   Result Value Ref Range    WBC 5.2 4.0 - 11.0 10e9/L    RBC Count 4.72 4.4 - 5.9 10e12/L    Hemoglobin 14.1 13.3 - 17.7 g/dL    Hematocrit 42.1 40.0 - 53.0 %    MCV 89 78 - 100 fl    MCH 29.9 26.5 - 33.0 pg    MCHC 33.5 31.5 - 36.5 g/dL    RDW 13.2 10.0 - 15.0 %    Platelet Count 190 150 - 450 10e9/L   Vitamin B12   Result Value Ref Range    Vitamin B12 507 193 - 986 pg/mL         Reviewed and discussed BMP done on 09/24/2016  Reviewed and discussed lipids done on 09/24/2016  Reviewed and discussed TSH done on 09/24/2016  Reviewed and discussed liver labs done on 06/27/2011  Reviewed and discussed BMP done on 09/23/2019  Reviewed and discussed CBC done on 09/23/2019    Assessment & Plan   Cameron was seen today for er f/u.    Diagnoses and all orders for this visit:    Elevated serum creatinine  Patient's creatinine levels were elevated in ED  Could be due to dehydration  Levels were fine previously  He's requesting to recheck levels today   -     Basic metabolic panel    Anemia, unspecified type  -     CBC with platelets  -     Ferritin  -     Vitamin B12  Mild was detected in " ED.     Parotid gland enlargement  Enlarged parotid glands bilaterally noted one exam   Per patient report this is chronic     Anxiety  Patient was seen in ED on 09/23/2019 for anxiety and paraesthesia   Reports he woke up shaking and had tingling sensation  Also had general body soreness and headache  Started noticing these symptoms 2-3 months ago  Has had 3 episodes so far  Never tried medications in past  Never had cardiac work up  Explained there are medications he can take for anxiety  He is not interested in medication as symptoms are under control  Declined for echo as well  Let me know if symptoms get worse  Stay well hydrated  Avoid excessive caffeine  Avoid any decongestant  Stay well hydrated     Paresthesia  See above    Family history of colon cancer  Patient has family history of colon cancer in mother  She was diagnosed in 2010  He will need colonoscopy after 12/13/2019  Comments:  mother   Advised to make appointment with your PCP for physical and they can order colonoscopy for you once you turn 39    Screening for thyroid disorder  -     TSH with free T4 reflex    Screening for lipid disorders  -     Lipid panel reflex to direct LDL Non-fasting; Future    Patient declined flu shot today  Declined for HIV test     Patient will be due for physical per his report.  Patient Instructions   Labs today  Schedule an appointment for physical   Follow-up in 12/2019  Seek sooner medical attention if there is any worsening of symptoms or problems    The information in this document, created by the medical scribe for me, accurately reflects the services I personally performed and the decisions made by me. I have reviewed and approved this document for accuracy prior to leaving the patient care area.  October 17, 2019 4:26 PM    Britany Meadows MD  [facility]

## 2019-10-17 NOTE — PATIENT INSTRUCTIONS
Labs today  Schedule an appointment for physical  Follow-up in 12/2019 with PCP  Seek sooner medical attention if there is any worsening of symptoms or problems

## 2019-10-18 LAB
ANION GAP SERPL CALCULATED.3IONS-SCNC: 4 MMOL/L (ref 3–14)
BUN SERPL-MCNC: 16 MG/DL (ref 7–30)
CALCIUM SERPL-MCNC: 8.8 MG/DL (ref 8.5–10.1)
CHLORIDE SERPL-SCNC: 104 MMOL/L (ref 94–109)
CO2 SERPL-SCNC: 29 MMOL/L (ref 20–32)
CREAT SERPL-MCNC: 1.09 MG/DL (ref 0.66–1.25)
FERRITIN SERPL-MCNC: 68 NG/ML (ref 26–388)
GFR SERPL CREATININE-BSD FRML MDRD: 85 ML/MIN/{1.73_M2}
GLUCOSE SERPL-MCNC: 97 MG/DL (ref 70–99)
POTASSIUM SERPL-SCNC: 4.7 MMOL/L (ref 3.4–5.3)
SODIUM SERPL-SCNC: 137 MMOL/L (ref 133–144)
TSH SERPL DL<=0.005 MIU/L-ACNC: 1.49 MU/L (ref 0.4–4)

## 2019-10-18 NOTE — RESULT ENCOUNTER NOTE
Kristen Barnes,    This is to inform you regarding your test result.    Vitamin B 12 level is normal.  CBC result which includes white count Hemoglobin and  Platelet Counts is normal.       Sincerely,      Dr.Nasima Abdiel MD,FACP

## 2019-10-18 NOTE — RESULT ENCOUNTER NOTE
Kristen Barnes,    This is to inform you regarding your test result.    TSH which is thyroid hormone is normal.  Basic metabolic panel which includes electrolytes and kidney fucntion is normal.  Glucose which is your blood sugar is normal.  All your lab results are good      Sincerely,      Dr.Nasima Abdiel MD,FACP

## 2019-10-18 NOTE — RESULT ENCOUNTER NOTE
Kristen Barnes,    This is to inform you regarding your test result.    Ferritin which is iron stores in the body is normal.   Other results are peding.      Sincerely,      Dr.Nasima Abdiel MD,FACP

## 2020-01-09 ENCOUNTER — HOSPITAL ENCOUNTER (EMERGENCY)
Facility: CLINIC | Age: 40
Discharge: HOME | End: 2020-01-09
Attending: EMERGENCY MEDICINE | Admitting: EMERGENCY MEDICINE
Payer: COMMERCIAL

## 2020-01-09 VITALS
HEIGHT: 73 IN | DIASTOLIC BLOOD PRESSURE: 66 MMHG | SYSTOLIC BLOOD PRESSURE: 124 MMHG | RESPIRATION RATE: 13 BRPM | HEART RATE: 79 BPM | BODY MASS INDEX: 20.15 KG/M2 | OXYGEN SATURATION: 100 % | TEMPERATURE: 97.9 F | WEIGHT: 152 LBS

## 2020-01-09 DIAGNOSIS — H10.31 ACUTE CONJUNCTIVITIS OF RIGHT EYE, UNSPECIFIED ACUTE CONJUNCTIVITIS TYPE: ICD-10-CM

## 2020-01-09 PROCEDURE — 25000125 ZZHC RX 250

## 2020-01-09 PROCEDURE — 99283 EMERGENCY DEPT VISIT LOW MDM: CPT

## 2020-01-09 RX ORDER — CIPROFLOXACIN HYDROCHLORIDE 3.5 MG/ML
1-2 SOLUTION/ DROPS TOPICAL
Qty: 8.4 ML | Refills: 0 | Status: SHIPPED | OUTPATIENT
Start: 2020-01-09 | End: 2020-06-23

## 2020-01-09 RX ORDER — PROPARACAINE HYDROCHLORIDE 5 MG/ML
SOLUTION/ DROPS OPHTHALMIC
Status: COMPLETED
Start: 2020-01-09 | End: 2020-01-09

## 2020-01-09 RX ORDER — CIPROFLOXACIN HYDROCHLORIDE 3.5 MG/ML
1-2 SOLUTION/ DROPS TOPICAL EVERY 4 HOURS
Qty: 3.6 ML | Refills: 0 | Status: SHIPPED | OUTPATIENT
Start: 2020-01-09 | End: 2020-06-23

## 2020-01-09 RX ADMIN — FLUORESCEIN SODIUM: 1 STRIP OPHTHALMIC at 04:41

## 2020-01-09 RX ADMIN — PROPARACAINE HYDROCHLORIDE 1 DROP: 5 SOLUTION/ DROPS OPHTHALMIC at 04:41

## 2020-01-09 ASSESSMENT — MIFFLIN-ST. JEOR: SCORE: 1653.35

## 2020-01-09 ASSESSMENT — ENCOUNTER SYMPTOMS
COUGH: 1
PHOTOPHOBIA: 1
EYE DISCHARGE: 1
EYE REDNESS: 1
EYE PAIN: 1

## 2020-01-09 NOTE — ED PROVIDER NOTES
"  History     Chief Complaint:    Conjunctivitis    The history is provided by the patient.      Cameron Garza is a 39 year old male who presents with bilateral conjunctivitis that began 2 days ago. The patient reports eye pain, eye redness, eye discharge, and photophobia. He also reports nasal congestion and a cough. The patient notes that his son had pink eye last week. The patient was encouraged to come to the ED after calling the nurse line.    Allergies:  Amoxicillin     Medications:    The patient is currently on no regular medications.    Past Medical History:    Parotid gland enlargement  Anxiety  Paresthesia  Anemia   OCD  Allergic rhinitis  Panic disorder without agoraphobia    Past Surgical History:    Tonsillectomy   [place] teeth extraction    Family History:    Colon cancer - mother  Neuropathy - sister  Rectal cancer - mother    Social History:  Negative for tobacco use.  Positive for alcohol use - rarely.  Negative for drug use.  Patient accompanied to the ED by his 3 children.  Marital Status:   [2]     Review of Systems   HENT: Positive for congestion (nasal).    Eyes: Positive for photophobia, pain, discharge and redness.   Respiratory: Positive for cough.    All other systems reviewed and are negative.        Physical Exam     Patient Vitals for the past 24 hrs:   BP Temp Temp src Pulse Heart Rate Resp SpO2 Height Weight   01/09/20 0354 124/66 97.9  F (36.6  C) Oral 79 79 13 100 % 1.854 m (6' 1\") 68.9 kg (152 lb)     Physical Exam  General: Patient is alert and normal appearing.  HEENT: Head atraumatic    Eyes: pupils equal and reactive.  Conjunctival injection noted on examination.  On fluorescein dye Woods lamp examination no evidence of corneal abrasion or ulceration.  No periorbital warmth or erythema.  Pupil is equal and reactive to light and extraocular movements are intact with no diplopia.   Nares: patent   Oropharynx: no lesions, uvula midline, no palatal draping, normal voice, no " trismus  Neck: Supple without lymphadenopathy, no meningismus  Chest: Heart regular rate and rhythm.   Lungs: Equal clear to auscultation with no wheeze or rales  Abdomen: Soft, non tender, nondistended, normal bowel sounds  Back: No costovertebral angle tenderness, no midline C, T or L spine tenderness  Neuro: Grossly nonfocal, normal speech, strength equal bilaterally, CN 2-12 intact  Extremities: No deformities, equal radial and DP pulses. No clubbing, cyanosis.  No edema  Skin: Warm and dry with no rash.       Emergency Department Course     Interventions:  0441: Alcaine 1 drop  0441: Ful-joanie 1 mg ophthalmic strip     Emergency Department Course:  Past medical records, nursing notes, and vitals reviewed.    0401: I performed an exam of the patient as documented above.     0422: I rechecked the patient and discussed the results of his workup thus far.     I personally reviewed the results with the Patient and his 3 children and answered all related questions prior to discharge.     Findings and plan explained to the Patient and his 3 children. Patient discharged home with instructions regarding supportive care, medications, and reasons to return. The importance of close follow-up was reviewed.     Impression & Plan     Medical Decision Making:  Cameron Garza is a 39 year old male who presents for evaluation of a red eye.  A broad differential diagnosis was considered including bacterial conjunctivitis, viral conjunctivitis, foreign body, corneal abrasion, chemical vs allergic conjunctivitis, corneal ulcer, HSV, herpes zoster opthalmicus, endopthalmitis, orbital cellulitis, etc.  Signs and symptoms consistent with a conjunctivitis, likely bacterial. Will start antibiotics and have close follow-up of eye physician.  No red flag symptoms to suggest any of the above worrisome etiologies.  No dendrite or ulcer seen on Woods lamp examination.           Diagnosis:    ICD-10-CM   1. Acute conjunctivitis of right eye,  unspecified acute conjunctivitis type H10.31     Disposition:  Discharged to home.    Discharge Medications:  New Prescriptions    CIPROFLOXACIN (CILOXAN) 0.3 % OPHTHALMIC SOLUTION    Place 1-2 drops into the right eye every 2 hours for 7 days    CIPROFLOXACIN (CILOXAN) 0.3 % OPHTHALMIC SOLUTION    Place 1-2 drops into the right eye every 4 hours for 6 days     Scribe Disclosure:  Gabriella RECIO, am serving as a scribe at 4:07 AM on 1/9/2020 to document services personally performed by Kinza Baumann MD based on my observations and the provider's statements to me.     Gabriella Dial  1/9/2020    EMERGENCY DEPARTMENT       Kinza Baumann MD  01/09/20 0509

## 2020-01-09 NOTE — ED AVS SNAPSHOT
Emergency Department  [address] [place] [id number]  Phone:  [phone]  Fax:  [phone]                                    Cameron Garza   MRN: [id number]    Department:   Emergency Department   Date of Visit:  1/9/2020           After Visit Summary Signature Page    I have received my discharge instructions, and my questions have been answered. I have discussed any challenges I see with this plan with the nurse or doctor.    ..........................................................................................................................................  Patient/Patient Representative Signature      ..........................................................................................................................................  Patient Representative Print Name and Relationship to Patient    ..................................................               ................................................  Date                                   Time    ..........................................................................................................................................  Reviewed by Signature/Title    ...................................................              ..............................................  Date                                               Time          22EPIC Rev 08/18

## 2020-03-11 ENCOUNTER — HEALTH MAINTENANCE LETTER (OUTPATIENT)
Age: 40
End: 2020-03-11

## 2020-06-22 ENCOUNTER — NURSE TRIAGE (OUTPATIENT)
Dept: NURSING | Facility: CLINIC | Age: 40
End: 2020-06-22

## 2020-06-23 ENCOUNTER — VIRTUAL VISIT (OUTPATIENT)
Dept: FAMILY MEDICINE | Facility: CLINIC | Age: 40
End: 2020-06-23
Payer: COMMERCIAL

## 2020-06-23 DIAGNOSIS — Z20.822 EXPOSURE TO COVID-19 VIRUS: Primary | ICD-10-CM

## 2020-06-23 DIAGNOSIS — R68.83 CHILLS: ICD-10-CM

## 2020-06-23 DIAGNOSIS — R19.7 DIARRHEA, UNSPECIFIED TYPE: ICD-10-CM

## 2020-06-23 DIAGNOSIS — R05.9 COUGH: ICD-10-CM

## 2020-06-23 PROCEDURE — 99213 OFFICE O/P EST LOW 20 MIN: CPT | Mod: 95 | Performed by: INTERNAL MEDICINE

## 2020-06-23 RX ORDER — OMEGA-3/DHA/EPA/FISH OIL 60 MG-90MG
500 CAPSULE ORAL DAILY
COMMUNITY
Start: 2018-12-28

## 2020-06-23 NOTE — PATIENT INSTRUCTIONS
"Get the test done for Covid-19  Order is placed   Central scheduling will contact you about this   Follow up with your primary provider in 2-3 months for annual physical  Seek sooner medical attention if there is any worsening of symptoms or problems.         Patient Education     Coronavirus Disease 2019 (COVID-19): Prevention  The best way to prevent COVID-19 is to avoid contact with the virus. There is no vaccine yet.  Cancel travel and other outings  Stay informed about COVID-19 in your area. School, sporting events and other activities may be cancelled. Follow local instructions. For example, you may need to:     Stay at least 6 feet away from others. This is called \"social distancing.\"    Stay at home and isolate yourself. You may hear terms like \"self-isolate, \"quarantine,\"  stay at home,   shelter in place,  and  lockdown.   Don t travel to areas with a COVID-19 outbreak. Don t go on a cruise. It s best to cancel any non-essential travel right now.  For the most up-to-date travel advisories, visit the CDC website at www.cdc.gov/coronavirus/2019-ncov/travelers.  When you re at home    Wash your hands often. Use soap and clean, running water for at least 20 seconds. Or, use hand  that has at least 60% alcohol.    Don't touch your eyes, nose, or mouth unless you have clean hands.    Don t kiss someone who is sick.    If you need to cough or sneeze, do it into a tissue. Then, throw the tissue into the trash. If you don't have tissues, cough or sneeze into the bend of your elbow.    Try to avoid \"high-touch\" surfaces, like doorknobs, handles, light switches, desks, printers, phones, kitchen counters, tables and bathroom surfaces. Clean these often with disinfectant (read the label for instructions). For cleaning tips, go to www.cdc.gov/coronavirus/2019-ncov/prepare/cleaning-disinfection.html.    Check your home supplies. Try to keep a 2-week supply of medicine, food, and household items.    Make a plan " "for childcare, work, and ways to stay in touch with others. Know who will help you if you get sick.    Don't be around people who are sick.    Don t share eating or drinking utensils with sick people.    Wash your hands after touching animals. Don't touch animals that may be sick.  If you leave home    Stay at least 6 feet away from all people.    Try to avoid \"high-touch\" public surfaces, like doorknobs, handles, and light switches. If you need to touch these, clean them first with a disinfecting wipe. Or, touch them using a tissue or paper towel.    Use hand  often. Make sure it has at least 60% alcohol.    Don't touch your eyes, nose, or mouth unless you have clean hands.    If you need to cough or sneeze, do it into a tissue. Then throw the tissue into the trash. If you don't have tissues, cough or sneeze into the bend of your elbow.    Avoid public gatherings.    Wear a cloth face mask in public. It should cover both your nose and mouth. You may need to make your own mask using a bandana, T-shirt, or other cloth. See the CDC s instructions on how to make a mask.  If you re at a work site    Follow all of the instructions above.    If you feel sick in any way, go home and stay home.    Tell your manager if you are well but live with someone who has COVID-19.    Wash your hands often with soap and clean, running water for at least 20 seconds. Or, use hand  with at least 60% alcohol.    Don't shake hands. Don t have in-person meetings. (Meet over phone or video.)    Don't use other people's desks, offices, phones or equipment (pens, keyboards, eating or drinking utensils, etc.), if possible.    Use office yolanda one person at a time. Don t share coffee, tea or food in the office. Don t have meals in groups.    Wear a mask over your nose and mouth.    Clean work surfaces often with disinfectant. These include desks, photocopiers, printers, phones, kitchen counters, fridge door handles, bathroom " surfaces, and others.  If you ve been around someone with COVID-19  In the past 14 days, if you've been around someone who has (or may have) COVID-19:    Contact your care team to ask for advice. Follow all instructions. You may need to stay home and limit your activities for up to 2 weeks.    Take your temperature every morning and evening for at least 14 days. This is to check for fever. Keep a record of the readings.    Watch for symptoms of the virus. (See the CDC's symptom .) If you have symptoms, contact your care team.  When to contact your care team  Call your care team if you think you have COVID-19 symptoms. These can include fever, cough, trouble breathing, body aches, headaches, chills or repeated shaking with chills, sore throat, loss of tasted or smell, or diarrhea (loose, watery poops).  Don t go to a clinic or hospital before speaking to your care team.  Last modified date: 5/8/2020  This information has been modified by your health care provider with permission from the publisher.      0812-4555 [facility], [address] [place] [address], [place] [address]. All rights reserved. This information is not intended as a substitute for professional medical care. Always follow your healthcare professional's instructions. This information has been modified by your health care provider with permission from the publisher.

## 2020-06-23 NOTE — TELEPHONE ENCOUNTER
Pt reports he was informed a co worker tested positive for COVID 19. Pt reports mild symptoms, see below. Pt wondering if PCP can order testing.     Advised pt per Care Advice and referred pt to [facility] and CDC websites for more information and information on testing.     Pt verbalizes understanding and agrees to plan.    COVID 19 Nurse Triage Plan/Patient Instructions    Please be aware that novel coronavirus (COVID-19) may be circulating in the community. If you develop symptoms such as fever, cough, or SOB or if you have concerns about the presence of another infection including coronavirus (COVID-19), please contact your health care provider or visit [email or web address].     Disposition/Instructions    Patient to stay at home and follow home care protocol based instructions.     Thank you for taking steps to prevent the spread of this virus.  o Limit your contact with others.  o Wear a simple mask to cover your cough.  o Wash your hands well and often.    Resources    [facility]: About COVID-19: [email or web address]    CDC: What to Do If You're Sick: www.cdc.gov/coronavirus/2019-ncov/about/steps-when-sick.html    CDC: Ending Home Isolation: www.cdc.gov/coronavirus/2019-ncov/hcp/disposition-in-home-patients.html     CDC: Caring for Someone: www.cdc.gov/coronavirus/2019-ncov/if-you-are-sick/care-for-someone.html     [facility]: Interim Guidance for Hospital Discharge to Home: www.health.[facility].mn.us/diseases/coronavirus/hcp/hospdischarge.pdf    [facility] clinical trials (COVID-19 research studies): clinicalaffairs.[facility]-clinical-trials     Below are the COVID-19 hotlines at the [facility] ([facility]). Interpreters are available.   o For health questions: Call 318-858-1109 or 1-389.281.6481 (7 a.m. to 7 p.m.)  o For questions about schools and childcare: Call 156-732-0102 or 1-498.999.8204 (7 a.m. to 7 p.m.)                     Reason for Disposition    [1] Symptoms of COVID-19  (e.g., cough, fever, SOB, or others) AND [2] within 14 days of EXPOSURE (close contact) with diagnosed or suspected COVID-19 patient    [1] COVID-19 infection suspected by caller or triager AND [2] mild symptoms (cough, fever, or others) AND [3] no complications or SOB    Additional Information    Negative: COVID-19 has been diagnosed by a healthcare provider (HCP)    Negative: COVID-19 lab test positive    Negative: [1] Symptoms of COVID-19 (e.g., cough, fever, SOB, or others) AND [2] lives in an area with community spread    Negative: SEVERE difficulty breathing (e.g., struggling for each breath, speaks in single words)    Negative: Difficult to awaken or acting confused (e.g., disoriented, slurred speech)    Negative: Bluish (or gray) lips or face now    Negative: Shock suspected (e.g., cold/pale/clammy skin, too weak to stand, low BP, rapid pulse)    Negative: Sounds like a life-threatening emergency to the triager    Negative: [1] COVID-19 exposure AND [2] no symptoms    Negative: COVID-19 and Breastfeeding, questions about    Negative: [1] Adult with possible COVID-19 symptoms AND [2] triager concerned about severity of symptoms or other causes    Negative: SEVERE or constant chest pain or pressure (Exception: mild central chest pain, present only when coughing)    Negative: MODERATE difficulty breathing (e.g., speaks in phrases, SOB even at rest, pulse 100-120)    Negative: Patient sounds very sick or weak to the triager    Negative: MILD difficulty breathing (e.g., minimal/no SOB at rest, SOB with walking, pulse <100)    Negative: Chest pain or pressure    Negative: Fever > 103 F (39.4 C)    Negative: [1] Fever > 101 F (38.3 C) AND [2] age > 60    Negative: [1] Fever > 100.0 F (37.8 C) AND [2] bedridden (e.g., nursing home patient, CVA, chronic illness, recovering from surgery)    Negative: HIGH RISK patient (e.g., age > 64 years, diabetes, heart or lung disease, weak immune system)    Negative: Fever  "present > 3 days (72 hours)    Negative: [1] Fever returns after gone for over 24 hours AND [2] symptoms worse or not improved    Negative: [1] Continuous (nonstop) coughing interferes with work or school AND [2] no improvement using cough treatment per protocol    Answer Assessment - Initial Assessment Questions  1. CLOSE CONTACT: \"Who is the person with the confirmed or suspected COVID-19 infection that you were exposed to?\"      Co-worker   2. PLACE of CONTACT: \"Where were you when you were exposed to COVID-19?\" (e.g., home, school, medical waiting room; which city?)      Work   3. TYPE of CONTACT: \"How much contact was there?\" (e.g., sitting next to, live in same house, work in same office, same building)      Was within less than six feet once  4. DURATION of CONTACT: \"How long were you in contact with the COVID-19 patient?\" (e.g., a few seconds, passed by person, a few minutes, live with the patient)      15-30 minutes  5. DATE of CONTACT: \"When did you have contact with a COVID-19 patient?\" (e.g., how many days ago)      6/18/20  6. TRAVEL: Have you traveled out of the country recently?\" If so, \"When and where?\"      * Also ask about out-of-state travel, since the CDC has identified some high-risk cities for community spread in the .      * Note: Travel becomes less relevant if there is widespread community transmission where the patient lives.     Pt denies  7. COMMUNITY SPREAD: \"Are there lots of cases of COVID-19 (community spread) where you live?\" (See public health department website, if unsure)       One other case in the building in March   8. SYMPTOMS: \"Do you have any symptoms?\" (e.g., fever, cough, breathing difficulty)     Diarrhea, chills, muscle aches and coughing today \"that's about it\"   9. PREGNANCY OR POSTPARTUM: \"Is there any chance you are pregnant?\" \"When was your last menstrual period?\" \"Did you deliver in the last 2 weeks?\"     n/a  10. HIGH RISK: \"Do you have any heart or lung " "problems? Do you have a weak immune system?\" (e.g., CHF, COPD, asthma, HIV positive, chemotherapy, renal failure, diabetes mellitus, sickle cell anemia)        Pt denies    Protocols used: CORONAVIRUS (COVID-19) EXPOSURE-A-AH 5.16.20, CORONAVIRUS (COVID-19) DIAGNOSED OR [id number] 5.16.20      "

## 2020-06-23 NOTE — PROGRESS NOTES
"Cameron Garza is a 39 year old male who is being evaluated via a billable video visit.      The patient has been notified of following:     \"This video visit will be conducted via a call between you and your physician/provider. We have found that certain health care needs can be provided without the need for an in-person physical exam.  This service lets us provide the care you need with a video conversation.  If a prescription is necessary we can send it directly to your pharmacy.  If lab work is needed we can place an order for that and you can then stop by our lab to have the test done at a later time.    Video visits are billed at different rates depending on your insurance coverage.  Please reach out to your insurance provider with any questions.    If during the course of the call the physician/provider feels a video visit is not appropriate, you will not be charged for this service.\"    Patient has given verbal consent for Video visit? Yes DOXIMITY [phone]    Will anyone else be joining your video visit? No    Subjective     Cameron Garza is a 39 year old male who presents today via video visit for the following health issues:    [facility]      Video Start Time: 12:29 PM    Patient said that he got exposed to someone who possibly  had COVID-19  This was his colleague at work and his test result is pending  He was with that person during the meeting.  He reported that he had some diarrhea, chills, body ache and cough  It is improving  Exposure happened last week  He is not having any major symptoms but at home with children who are 2, 5 and 7 years old  Children are not having any symptoms  There was lots of noise due to his 3 children who were with him.    Patient Active Problem List   Diagnosis     Anxiety     Paresthesia     Anemia, unspecified type     Parotid gland enlargement     Family history of colon cancer     No past surgical history on file.    Social History     Tobacco Use     Smoking status: " "Never Smoker     Smokeless tobacco: Never Used   Substance Use Topics     Alcohol use: Yes     Comment: rare      Family History   Problem Relation Age of Onset     Colon Cancer Mother      Family History Negative Father          Current Outpatient Medications   Medication Sig Dispense Refill     Omega-3 Fatty Acids (FISH OIL) 500 MG CAPS Take 500 mg by mouth daily         Reviewed and updated as needed this visit by Provider         Review of Systems   Constitutional, HEENT, cardiovascular, pulmonary, gi and gu systems are negative, except as otherwise noted.      Objective    There were no vitals taken for this visit.  Estimated body mass index is 20.05 kg/m  as calculated from the following:    Height as of 1/9/20: 1.854 m (6' 1\").    Weight as of 1/9/20: 68.9 kg (152 lb).  Physical Exam     GENERAL: Healthy, alert and no distress  EYES: Eyes grossly normal to inspection.  No discharge or erythema, or obvious scleral/conjunctival abnormalities.  RESP: No audible wheeze, cough, or visible cyanosis.  No visible retractions or increased work of breathing.    SKIN: Visible skin clear. No significant rash, abnormal pigmentation or lesions.  NEURO: Cranial nerves grossly intact.  Mentation and speech appropriate for age.  PSYCH: Mentation appears normal, affect normal/bright, judgement and insight intact, normal speech and appearance well-groomed.              Assessment & Plan     Cameron was seen today for follow up.    Diagnoses and all orders for this visit:    Exposure to COVID-19 virus  -     Symptomatic COVID-19 Virus (Coronavirus) by PCR; Future  Patient got exposed to someone who had possible COVID-19 at work.  The result of that person is pending.  He does not have any high-grade fever or any major symptoms but did have myalgias and some diarrhea and body aches.  I discussed the importance of self quarantine.  Discussed the importance of Taking all the precautions as recommended by CDC  Send information about " "COVID-19 via my chart.  Ordered the test      Diarrhea, unspecified type  -     Symptomatic COVID-19 Virus (Coronavirus) by PCR; Future    Chills  -     Symptomatic COVID-19 Virus (Coronavirus) by PCR; Future    Cough  -     Symptomatic COVID-19 Virus (Coronavirus) by PCR; Future    Seek medical attention in case of worsening of symptoms.     Disclaimer: This note consists of symbols derived from keyboarding, dictation and/or voice recognition software. As a result, there may be errors in the script that have gone undetected. Please consider this when interpreting information found in this chart.      See Patient Instructions  Patient Instructions   Get the test done for Covid-19  Order is placed   Central scheduling will contact you about this   Follow up with your primary provider in 2-3 months for annual physical  Seek sooner medical attention if there is any worsening of symptoms or problems.         Patient Education     Coronavirus Disease 2019 (COVID-19): Prevention  The best way to prevent COVID-19 is to avoid contact with the virus. There is no vaccine yet.  Cancel travel and other outings  Stay informed about COVID-19 in your area. School, sporting events and other activities may be cancelled. Follow local instructions. For example, you may need to:     Stay at least 6 feet away from others. This is called \"social distancing.\"    Stay at home and isolate yourself. You may hear terms like \"self-isolate, \"quarantine,\"  stay at home,   shelter in place,  and  lockdown.   Don t travel to areas with a COVID-19 outbreak. Don t go on a cruise. It s best to cancel any non-essential travel right now.  For the most up-to-date travel advisories, visit the CDC website at www.cdc.gov/coronavirus/2019-ncov/travelers.  When you re at home    Wash your hands often. Use soap and clean, running water for at least 20 seconds. Or, use hand  that has at least 60% alcohol.    Don't touch your eyes, nose, or mouth " "unless you have clean hands.    Don t kiss someone who is sick.    If you need to cough or sneeze, do it into a tissue. Then, throw the tissue into the trash. If you don't have tissues, cough or sneeze into the bend of your elbow.    Try to avoid \"high-touch\" surfaces, like doorknobs, handles, light switches, desks, printers, phones, kitchen counters, tables and bathroom surfaces. Clean these often with disinfectant (read the label for instructions). For cleaning tips, go to www.cdc.gov/coronavirus/2019-ncov/prepare/cleaning-disinfection.html.    Check your home supplies. Try to keep a 2-week supply of medicine, food, and household items.    Make a plan for childcare, work, and ways to stay in touch with others. Know who will help you if you get sick.    Don't be around people who are sick.    Don t share eating or drinking utensils with sick people.    Wash your hands after touching animals. Don't touch animals that may be sick.  If you leave home    Stay at least 6 feet away from all people.    Try to avoid \"high-touch\" public surfaces, like doorknobs, handles, and light switches. If you need to touch these, clean them first with a disinfecting wipe. Or, touch them using a tissue or paper towel.    Use hand  often. Make sure it has at least 60% alcohol.    Don't touch your eyes, nose, or mouth unless you have clean hands.    If you need to cough or sneeze, do it into a tissue. Then throw the tissue into the trash. If you don't have tissues, cough or sneeze into the bend of your elbow.    Avoid public gatherings.    Wear a cloth face mask in public. It should cover both your nose and mouth. You may need to make your own mask using a bandana, T-shirt, or other cloth. See the CDC s instructions on how to make a mask.  If you re at a work site    Follow all of the instructions above.    If you feel sick in any way, go home and stay home.    Tell your manager if you are well but live with someone who has " COVID-19.    Wash your hands often with soap and clean, running water for at least 20 seconds. Or, use hand  with at least 60% alcohol.    Don't shake hands. Don t have in-person meetings. (Meet over phone or video.)    Don't use other people's desks, offices, phones or equipment (pens, keyboards, eating or drinking utensils, etc.), if possible.    Use office yolanda one person at a time. Don t share coffee, tea or food in the office. Don t have meals in groups.    Wear a mask over your nose and mouth.    Clean work surfaces often with disinfectant. These include desks, photocopiers, printers, phones, kitchen counters, fridge door handles, bathroom surfaces, and others.  If you ve been around someone with COVID-19  In the past 14 days, if you've been around someone who has (or may have) COVID-19:    Contact your care team to ask for advice. Follow all instructions. You may need to stay home and limit your activities for up to 2 weeks.    Take your temperature every morning and evening for at least 14 days. This is to check for fever. Keep a record of the readings.    Watch for symptoms of the virus. (See the CDC's symptom .) If you have symptoms, contact your care team.  When to contact your care team  Call your care team if you think you have COVID-19 symptoms. These can include fever, cough, trouble breathing, body aches, headaches, chills or repeated shaking with chills, sore throat, loss of tasted or smell, or diarrhea (loose, watery poops).  Don t go to a clinic or hospital before speaking to your care team.  Last modified date: 5/8/2020  This information has been modified by your health care provider with permission from the publisher.      [phone] [employer], [address], [place] [address]. All rights reserved. This information is not intended as a substitute for professional medical care. Always follow your healthcare professional's instructions. This information has been modified  by your health care provider with permission from the publisher.               Return in about 3 months (around 9/23/2020) for Physical Exam.    Britany Meadows MD  [facility]      Video-Visit Details    Type of service:  Video Visit    Video End Time:12:40 PM    Originating Location (pt. Location): Home    Distant Location (provider location):  [facility]     Platform used for Video Visit: Doximity    Return in about 3 months (around 9/23/2020) for Physical Exam.       Britany Meadows MD

## 2020-08-13 ENCOUNTER — HOSPITAL ENCOUNTER (EMERGENCY)
Facility: CLINIC | Age: 40
Discharge: HOME | End: 2020-08-13
Attending: EMERGENCY MEDICINE | Admitting: EMERGENCY MEDICINE
Payer: COMMERCIAL

## 2020-08-13 VITALS
HEIGHT: 74 IN | DIASTOLIC BLOOD PRESSURE: 80 MMHG | OXYGEN SATURATION: 98 % | HEART RATE: 85 BPM | BODY MASS INDEX: 19.89 KG/M2 | SYSTOLIC BLOOD PRESSURE: 115 MMHG | TEMPERATURE: 97.5 F | WEIGHT: 155 LBS | RESPIRATION RATE: 20 BRPM

## 2020-08-13 DIAGNOSIS — Z20.822 SUSPECTED COVID-19 VIRUS INFECTION: ICD-10-CM

## 2020-08-13 DIAGNOSIS — R53.1 WEAKNESS: ICD-10-CM

## 2020-08-13 DIAGNOSIS — R20.2 PARESTHESIAS: ICD-10-CM

## 2020-08-13 LAB
ANION GAP SERPL CALCULATED.3IONS-SCNC: 2 MMOL/L (ref 3–14)
BUN SERPL-MCNC: 13 MG/DL (ref 7–30)
CALCIUM SERPL-MCNC: 9 MG/DL (ref 8.5–10.1)
CHLORIDE SERPL-SCNC: 105 MMOL/L (ref 94–109)
CO2 SERPL-SCNC: 31 MMOL/L (ref 20–32)
CREAT SERPL-MCNC: 1.13 MG/DL (ref 0.66–1.25)
GFR SERPL CREATININE-BSD FRML MDRD: 80 ML/MIN/{1.73_M2}
GLUCOSE SERPL-MCNC: 68 MG/DL (ref 70–99)
POTASSIUM SERPL-SCNC: 4.6 MMOL/L (ref 3.4–5.3)
SODIUM SERPL-SCNC: 138 MMOL/L (ref 133–144)

## 2020-08-13 PROCEDURE — 80048 BASIC METABOLIC PNL TOTAL CA: CPT | Performed by: EMERGENCY MEDICINE

## 2020-08-13 PROCEDURE — C9803 HOPD COVID-19 SPEC COLLECT: HCPCS

## 2020-08-13 PROCEDURE — U0003 INFECTIOUS AGENT DETECTION BY NUCLEIC ACID (DNA OR RNA); SEVERE ACUTE RESPIRATORY SYNDROME CORONAVIRUS 2 (SARS-COV-2) (CORONAVIRUS DISEASE [COVID-19]), AMPLIFIED PROBE TECHNIQUE, MAKING USE OF HIGH THROUGHPUT TECHNOLOGIES AS DESCRIBED BY CMS-2020-01-R: HCPCS | Performed by: EMERGENCY MEDICINE

## 2020-08-13 PROCEDURE — 99283 EMERGENCY DEPT VISIT LOW MDM: CPT

## 2020-08-13 ASSESSMENT — MIFFLIN-ST. JEOR: SCORE: 1682.83

## 2020-08-13 NOTE — ED AVS SNAPSHOT
Emergency Department  [address] [place] [address]  Phone:  [phone]  Fax:  [phone]                                    Cameron Garza   MRN: [id number]    Department:   Emergency Department   Date of Visit:  8/13/2020           After Visit Summary Signature Page    I have received my discharge instructions, and my questions have been answered. I have discussed any challenges I see with this plan with the nurse or doctor.    ..........................................................................................................................................  Patient/Patient Representative Signature      ..........................................................................................................................................  Patient Representative Print Name and Relationship to Patient    ..................................................               ................................................  Date                                   Time    ..........................................................................................................................................  Reviewed by Signature/Title    ...................................................              ..............................................  Date                                               Time          22EPIC Rev 08/18

## 2020-08-13 NOTE — ED PROVIDER NOTES
"History     Chief Complaint:  Generalized Weakness       HPI history supplemented by electronic chart review    Cameron Garza is a 39 year old male with a history of anxiety who presents for evaluation of multiple symptoms for the past 5 days including generalized weakness, slight nonbloody diarrhea, dry cough, and tingling to his face bilaterally.  His arms and legs feel fine other than diffuse whole-body weakness and aches.  This 1-year-old child had a slight cough briefly last week.  No known COVID-19 exposures.  He is not taking any medications at home.  He has been going into the office as part of his work for [employer].      Allergies:  Amoxicillin      Medications:    The patient is currently on no regular medications.     Past Medical History:    Parotid gland enlargement  Anxiety  Anemia   OCD  Allergic rhinitis  Panic disorder without agoraphobia     Past Surgical History:    Tonsillectomy   [place] teeth extraction     Family History:    Colon cancer - mother  Neuropathy - sister  Rectal cancer - mother     Social History:  Negative for tobacco use.  Positive for alcohol use - rarely.  Negative for drug use.      Review of Systems   ROS: 10 point ROS neg other than the symptoms noted above in the HPI.     Physical Exam     Patient Vitals for the past 24 hrs:   BP Temp Temp src Pulse Heart Rate Resp SpO2 Height Weight   08/13/20 1125 125/75 97.5  F (36.4  C) Oral 80 80 20 100 % 1.88 m (6' 2\") 70.3 kg (155 lb)      Physical Exam  Gen: Nontoxic-appearing male sitting upright in room 2  HENT: mucous membranes moist, L TM wnl, R TM wnl, mastoids nontender, OP clear without swelling or exudate  Eyes: pupils normal, no scleral injection  CV: regular rhythm, cap refill normal  Resp: unlabored, speaks in full phrases, no cough observed, no stridor  GI: abdomen soft and nontender, no guarding  MSK: no bony tenderness  Skin: appropriately warm and dry, no ecchymosis, no petechiae  Neuro: awake, " alert, clear speech, fully oriented, face symmetric,  normal, strength and sensation intact in all extr, no nuchal rigidity, ambulatory without ataxia  Psych: anxious, cooperative      Emergency Department Course     Laboratory:  Laboratory findings were communicated with the patient who voiced understanding of the findings.    COVID-19 Virus (Coronavirus), PCR NP Swab: pending       BMP: Anion gap 2 (L) Glucose 68 (L)  (Creatinine 1.13) o/w WNL     Emergency Department Course:    1117 Nursing notes and vitals reviewed.    1120 I performed an exam of the patient as documented above.     1138 IV was inserted and blood was drawn for laboratory testing, results above.    1138 The patient's nose was swabbed and this sample was sent for COVID testing, findings above.      1235 Findings and plan explained to the Patient. Patient discharged home with instructions regarding supportive care, medications, and reasons to return. The importance of close follow-up was reviewed.     Impression & Plan     Medical Decision Making:  Reports multiple symptoms potentially consistent with COVID-19, though alternate etiologies including metabolic abnormality, other infection, neurologic conditions, and many others were considered.  There is no reproducible neurologic deficits on exam, nor any unilateral symptoms, and I do not think that emergent neuroimaging is indicated.  Reassurance was provided based on today's results, will acknowledging the diagnostic uncertainty.  Vital signs are satisfactory and I think he is an appropriate candidate for discharge home with close outpatient follow-up.  He should isolate himself until COVID-19 results are back.  He should return here in the unexpected event of sudden worsening at any hour.    Covid-19  Cameron Garza was evaluated during a global COVID-19 pandemic, which necessitated consideration that the patient might be at risk for infection with the SARS-CoV-2 virus that causes COVID-19.    Applicable protocols for evaluation were followed during the patient's care.   COVID-19 was considered as part of the patient's evaluation. The plan for testing is:  a test was obtained during this visit.       Diagnosis:     ICD-10-CM    1. Paresthesias  R20.2    2. Weakness  R53.1         Disposition:  Discharged to home.    This note was completed in part using Dragon voice recognition software. Although reviewed after completion, some word and grammatical errors may occur.     Scribe Disclosure:  I, Shivam Perez, am serving as a scribe at 12:31 PM on 8/13/2020 to document services personally performed by Tom Queen MD based on my observations and the provider's statements to me.          Tom Queen MD  08/13/20 2189

## 2020-08-13 NOTE — ED TRIAGE NOTES
Pt reports generalized weakness since Saturday. Pt reports feeling tingling in his face and intermittent tingling in his hands. Pt reports fatigue while at rest. Pt reports diarrhea on Saturday. Pt states he has had intermittent SOB.

## 2020-08-15 LAB
SARS-COV-2 RNA SPEC QL NAA+PROBE: NOT DETECTED
SPECIMEN SOURCE: NORMAL

## 2020-09-23 ENCOUNTER — DOCUMENTATION ONLY (OUTPATIENT)
Dept: FAMILY MEDICINE | Facility: CLINIC | Age: 40
End: 2020-09-23

## 2020-09-23 DIAGNOSIS — Z00.00 ROUTINE HISTORY AND PHYSICAL EXAMINATION OF ADULT: Primary | ICD-10-CM

## 2020-09-30 DIAGNOSIS — Z13.220 SCREENING FOR LIPID DISORDERS: ICD-10-CM

## 2020-09-30 DIAGNOSIS — Z00.00 ROUTINE HISTORY AND PHYSICAL EXAMINATION OF ADULT: ICD-10-CM

## 2020-09-30 LAB
ANION GAP SERPL CALCULATED.3IONS-SCNC: 7 MMOL/L (ref 3–14)
BUN SERPL-MCNC: 18 MG/DL (ref 7–30)
CALCIUM SERPL-MCNC: 9.2 MG/DL (ref 8.5–10.1)
CHLORIDE SERPL-SCNC: 104 MMOL/L (ref 94–109)
CHOLEST SERPL-MCNC: 157 MG/DL
CO2 SERPL-SCNC: 27 MMOL/L (ref 20–32)
CREAT SERPL-MCNC: 1.2 MG/DL (ref 0.66–1.25)
ERYTHROCYTE [DISTWIDTH] IN BLOOD BY AUTOMATED COUNT: 12.9 % (ref 10–15)
GFR SERPL CREATININE-BSD FRML MDRD: 75 ML/MIN/{1.73_M2}
GLUCOSE SERPL-MCNC: 83 MG/DL (ref 70–99)
HBA1C MFR BLD: 5.1 % (ref 0–5.6)
HCT VFR BLD AUTO: 41.1 % (ref 40–53)
HDLC SERPL-MCNC: 44 MG/DL
HGB BLD-MCNC: 13.9 G/DL (ref 13.3–17.7)
LDLC SERPL CALC-MCNC: 97 MG/DL
MCH RBC QN AUTO: 29.7 PG (ref 26.5–33)
MCHC RBC AUTO-ENTMCNC: 33.8 G/DL (ref 31.5–36.5)
MCV RBC AUTO: 88 FL (ref 78–100)
NONHDLC SERPL-MCNC: 113 MG/DL
PLATELET # BLD AUTO: 182 10E9/L (ref 150–450)
POTASSIUM SERPL-SCNC: 4.1 MMOL/L (ref 3.4–5.3)
RBC # BLD AUTO: 4.68 10E12/L (ref 4.4–5.9)
SODIUM SERPL-SCNC: 138 MMOL/L (ref 133–144)
TRIGL SERPL-MCNC: 81 MG/DL
WBC # BLD AUTO: 4.8 10E9/L (ref 4–11)

## 2020-09-30 PROCEDURE — 80048 BASIC METABOLIC PNL TOTAL CA: CPT | Performed by: INTERNAL MEDICINE

## 2020-09-30 PROCEDURE — 36415 COLL VENOUS BLD VENIPUNCTURE: CPT | Performed by: INTERNAL MEDICINE

## 2020-09-30 PROCEDURE — 85027 COMPLETE CBC AUTOMATED: CPT | Performed by: INTERNAL MEDICINE

## 2020-09-30 PROCEDURE — G0103 PSA SCREENING: HCPCS | Performed by: INTERNAL MEDICINE

## 2020-09-30 PROCEDURE — 83036 HEMOGLOBIN GLYCOSYLATED A1C: CPT | Performed by: INTERNAL MEDICINE

## 2020-09-30 PROCEDURE — 80061 LIPID PANEL: CPT | Performed by: INTERNAL MEDICINE

## 2020-10-01 ENCOUNTER — OFFICE VISIT (OUTPATIENT)
Dept: FAMILY MEDICINE | Facility: CLINIC | Age: 40
End: 2020-10-01
Payer: COMMERCIAL

## 2020-10-01 VITALS
BODY MASS INDEX: 20.3 KG/M2 | TEMPERATURE: 97.5 F | SYSTOLIC BLOOD PRESSURE: 104 MMHG | OXYGEN SATURATION: 99 % | HEIGHT: 73 IN | DIASTOLIC BLOOD PRESSURE: 66 MMHG | HEART RATE: 67 BPM | WEIGHT: 153.2 LBS

## 2020-10-01 DIAGNOSIS — Z12.11 SPECIAL SCREENING FOR MALIGNANT NEOPLASMS, COLON: ICD-10-CM

## 2020-10-01 DIAGNOSIS — Z00.00 ROUTINE HISTORY AND PHYSICAL EXAMINATION OF ADULT: Primary | ICD-10-CM

## 2020-10-01 DIAGNOSIS — Z12.5 SCREENING FOR PROSTATE CANCER: ICD-10-CM

## 2020-10-01 DIAGNOSIS — Z80.0 FAMILY HISTORY OF COLORECTAL CANCER: ICD-10-CM

## 2020-10-01 PROCEDURE — 99396 PREV VISIT EST AGE 40-64: CPT | Performed by: INTERNAL MEDICINE

## 2020-10-01 ASSESSMENT — MIFFLIN-ST. JEOR: SCORE: 1662.4

## 2020-10-01 NOTE — PROGRESS NOTES
SUBJECTIVE:   CC: Cameron Garza is an 39 year old male who presents for preventative health visit.       Patient has been advised of split billing requirements and indicates understanding: Yes  Healthy Habits:     Getting at least 3 servings of Calcium per day:  NO (unknown )    Bi-annual eye exam:  Yes    Dental care twice a year:  NO    Sleep apnea or symptoms of sleep apnea:  None    Diet:  Regular (no restrictions)    Frequency of exercise:  None    Duration of exercise:  N/A    Taking medications regularly:  Not Applicable    Barriers to taking medications:  Not applicable    Medication side effects:  Not applicable    PHQ-2 Total Score: 0    Additional concerns today:  Yes (Toe nail fungus )      Today's PHQ-2 Score:   PHQ-2 ( 1999 Pfizer) 10/1/2020   Q1: Little interest or pleasure in doing things 0   Q2: Feeling down, depressed or hopeless 0   PHQ-2 Score 0       Abuse: Current or Past(Physical, Sexual or Emotional)- No  Do you feel safe in your environment? Yes        Social History     Tobacco Use     Smoking status: Never Smoker     Smokeless tobacco: Never Used   Substance Use Topics     Alcohol use: Yes     Comment: rare      If you drink alcohol do you typically have >3 drinks per day or >7 drinks per week? No    Alcohol Use 10/1/2020   Prescreen: >3 drinks/day or >7 drinks/week? No       Last PSA: No results found for: PSA    Reviewed orders with patient. Reviewed health maintenance and updated orders accordingly - Yes  Lab work is in process    Reviewed and updated as needed this visit by clinical staff  Tobacco  Allergies  Meds              Reviewed and updated as needed this visit by Provider                Past Medical History:   Diagnosis Date     Family history of colorectal cancer         Review of Systems  CONSTITUTIONAL: NEGATIVE for fever, chills, change in weight  INTEGUMENTARY/SKIN: NEGATIVE for worrisome rashes, moles or lesions  EYES: NEGATIVE for vision changes or irritation  ENT:  "NEGATIVE for ear, mouth and throat problems  RESP: NEGATIVE for significant cough or SOB  CV: NEGATIVE for chest pain, palpitations or peripheral edema  GI: NEGATIVE for nausea, abdominal pain, heartburn, or change in bowel habits   male: negative for dysuria, hematuria, decreased urinary stream, erectile dysfunction, urethral discharge  MUSCULOSKELETAL: NEGATIVE for significant arthralgias or myalgia  NEURO: NEGATIVE for weakness, dizziness or paresthesias  PSYCHIATRIC: NEGATIVE for changes in mood or affect    OBJECTIVE:   /66 (BP Location: Right arm, Patient Position: Sitting, Cuff Size: Adult Regular)   Pulse 67   Temp 97.5  F (36.4  C) (Temporal)   Ht 1.86 m (6' 1.23\")   Wt 69.5 kg (153 lb 3.2 oz)   SpO2 99%   BMI 20.09 kg/m      Physical Exam  GENERAL: alert and no distress  EYES: Eyes grossly normal to inspection, PERRL and conjunctivae and sclerae normal  HENT: ear canals and TM's normal, nose and mouth without ulcers or lesions  NECK: no adenopathy, no asymmetry, masses, or scars and thyroid normal to palpation  RESP: lungs clear to auscultation - no rales, rhonchi or wheezes  CV: regular rate and rhythm, normal S1 S2, no S3 or S4, no murmur, click or rub, no peripheral edema and peripheral pulses strong  ABDOMEN: soft, nontender, no hepatosplenomegaly, no masses and bowel sounds normal  MS: no gross musculoskeletal defects noted, no edema  SKIN: no suspicious lesions or rashes  NEURO: Normal strength and tone, mentation intact and speech normal  PSYCH: mentation appears normal, affect normal/bright    Diagnostic Test Results:  Labs reviewed in Epic    ASSESSMENT/PLAN:   (Z00.00) Routine history and physical examination of adult  (primary encounter diagnosis)  Comment: yearly physical exam today within normal limits. Recent labs OK.  Plan: continue diet, exercise     (Z12.11) Special screening for malignant neoplasms, colon  (Z80.0) Family history of colorectal cancer  Comment: patient is due " "for colonoscopy due to family history  Plan: GASTROENTEROLOGY ADULT REF PROCEDURE ONLY        Patient has been advised of split billing requirements and indicates understanding: Yes  COUNSELING:   Reviewed preventive health counseling, as reflected in patient instructions  Special attention given to:        Regular exercise       Healthy diet/nutrition    Estimated body mass index is 20.09 kg/m  as calculated from the following:    Height as of this encounter: 1.86 m (6' 1.23\").    Weight as of this encounter: 69.5 kg (153 lb 3.2 oz).         He reports that he has never smoked. He has never used smokeless tobacco.      Counseling Resources:  ATP IV Guidelines  Pooled Cohorts Equation Calculator  FRAX Risk Assessment  ICSI Preventive Guidelines  Dietary Guidelines for Americans, 2010  USDA's MyPlate  ASA Prophylaxis  Lung CA Screening    Shanon Hodges MD  [facility]  "

## 2020-10-01 NOTE — LETTER
October 5, 2020      Cameron Garza  [address] [address]        Dear Anthony,    Your lab results came back normal. If you have any questions please dont hesitate to contact the clinic.     Thanks,      Dr. Hodges    Health Maintenance Due   Topic Date Due     HIV SCREENING  12/[birth year]/1994     INFLUENZA VACCINE (1) 09/01/2020       Resulted Orders   PSA, screen   Result Value Ref Range    PSA 0.84 0 - 4 ug/L      Comment:      Assay Method:  Chemiluminescence using Siemens Vista analyzer

## 2020-10-02 LAB — PSA SERPL-ACNC: 0.84 UG/L (ref 0–4)

## 2020-10-26 DIAGNOSIS — Z11.59 ENCOUNTER FOR SCREENING FOR OTHER VIRAL DISEASES: Primary | ICD-10-CM

## 2020-11-12 DIAGNOSIS — Z11.59 ENCOUNTER FOR SCREENING FOR OTHER VIRAL DISEASES: ICD-10-CM

## 2020-11-12 PROCEDURE — U0003 INFECTIOUS AGENT DETECTION BY NUCLEIC ACID (DNA OR RNA); SEVERE ACUTE RESPIRATORY SYNDROME CORONAVIRUS 2 (SARS-COV-2) (CORONAVIRUS DISEASE [COVID-19]), AMPLIFIED PROBE TECHNIQUE, MAKING USE OF HIGH THROUGHPUT TECHNOLOGIES AS DESCRIBED BY CMS-2020-01-R: HCPCS | Performed by: COLON & RECTAL SURGERY

## 2020-11-13 LAB
LABORATORY COMMENT REPORT: NORMAL
SARS-COV-2 RNA SPEC QL NAA+PROBE: NEGATIVE
SARS-COV-2 RNA SPEC QL NAA+PROBE: NORMAL
SPECIMEN SOURCE: NORMAL
SPECIMEN SOURCE: NORMAL

## 2020-11-15 ENCOUNTER — NURSE TRIAGE (OUTPATIENT)
Dept: NURSING | Facility: CLINIC | Age: 40
End: 2020-11-15

## 2020-11-15 NOTE — TELEPHONE ENCOUNTER
Cameron has a colonoscopy tomorrow.  Today has questions on prep.      COVID 19 Nurse Triage Plan/Patient Instructions    Please be aware that novel coronavirus (COVID-19) may be circulating in the community. If you develop symptoms such as fever, cough, or SOB or if you have concerns about the presence of another infection including coronavirus (COVID-19), please contact your health care provider or visit [email or web address].     Disposition/Instructions    Home care recommended. Follow home care protocol based instructions.    Thank you for taking steps to prevent the spread of this virus.  o Limit your contact with others.  o Wear a simple mask to cover your cough.  o Wash your hands well and often.    Resources    [facility]: About COVID-19: [email or web address]    CDC: What to Do If You're Sick: www.cdc.gov/coronavirus/2019-ncov/about/steps-when-sick.html    CDC: Ending Home Isolation: www.cdc.gov/coronavirus/2019-ncov/hcp/disposition-in-home-patients.html     CDC: Caring for Someone: www.cdc.gov/coronavirus/2019-ncov/if-you-are-sick/care-for-someone.html     [facility]: Interim Guidance for Hospital Discharge to Home: www.health.[facility]/diseases/coronavirus/hcp/hospdischarge.pdf    [facility] clinical trials (COVID-19 research studies): [email or web address].[email or web address]-clinical-trials     Below are the COVID-19 hotlines at the [place] [facility] ([facility]). Interpreters are available.   o For health questions: Call 031-230-9745 or 1-829.520.1999 (7 a.m. to 7 p.m.)  o For questions about schools and childcare: Call 602-030-4354 or 1-870.830.7583 (7 a.m. to 7 p.m.)                       Additional Information    Negative: RN needs further essential information from caller in order to complete triage    Negative: Requesting regular office appointment    Negative: [1] Caller requesting NON-URGENT health information AND [2] PCP's office is the best resource    Health Information question,  no triage required and triager able to answer question    Protocols used: INFORMATION ONLY CALL-A-AH

## 2020-11-15 NOTE — H&P
Colon & Rectal Surgery History and Physical  Pre-Endoscopy Procedure Note    History of Present Illness   I have been asked by Dr. Hodges to evaluate this 39 year old male for colorectal cancer screening. He has a family history of colon cancer, diagnosed in his mother.  He currently denies any abdominal pain, weight loss, bleeding per rectum, or recent change in bowel habits.    Past Medical History  Diagnosis Date     Family history of colorectal cancer        Past Surgical History  Procedure Laterality Date     AS REMOVAL OF TONSILS,<10 Y/O          Medications  Medication Sig     Multiple Vitamins-Minerals (MULTIVITAMIN ADULT PO)      Omega-3 Fatty Acids (FISH OIL) 500 MG CAPS Take 500 mg by mouth daily       Allergies  Allergen Reactions     Amoxicillin Hives        Family History   Family history includes Colon Cancer in his mother.     Social History    He reports that he has never smoked. He has never used smokeless tobacco. He reports current alcohol use. He reports that he does not use drugs.    Review of Systems   Constitutional:  No fever, weight change or fatigue.    Eyes:     No dry eyes or vision changes.   Ears/Nose/Throat/Neck:  No oral ulcers, sore throat or voice change.    Cardiovascular:   No palpitations, syncope, angina or edema.   Respiratory:    No chest pain, excessive sleepiness, shortness of breath or hemoptysis.    Gastrointestinal:   No abdominal pain, nausea, vomiting, diarrhea or heartburn.    Genitourinary:   No dysuria, hematuria, urinary retention or urinary frequency.   Musculoskeletal:  No joint swelling or arthralgias.    Dermatologic:  No skin rash or other skin changes.   Neurologic:    No focal weakness or numbness. No neuropathy.   Psychiatric:    No depression, anxiety, suicidal ideation, or paranoid ideation.   Endocrine:   No cold or heat intolerance, polydipsia, hirsutism, change in libido, or flushing.   Hematology/Lymphatic:  No bleeding or lymphadenopathy.     Allergy/Immunology:  No rhinitis or hives.     Physical Exam   Vitals:  /80, RR 16, HR 64, weight 69.4 kg (153 lb), SpO2 100 %.    General:  Alert and oriented to person, place and time   Airway: Normal oropharyngeal airway and neck mobility   Lungs:  Clear bilaterally   Heart:  Regular rate and rhythm   Abdomen: Soft, NT, ND, no masses   Rectal:  Perianal skin without excoriation, hemorrhoidal disease or anal fissure        Digital rectal examination reveals normal sphincter tone without masses    ASA Grade: I (normal healthy patient)      Impression: Cleared for use of conscious sedation for colorectal cancer screening    Plan: Proceed with colonoscopy     Mary Jane Daugherty MD  [facility]  [phone]

## 2020-11-16 ENCOUNTER — HOSPITAL ENCOUNTER (OUTPATIENT)
Facility: CLINIC | Age: 40
Discharge: HOME | End: 2020-11-16
Attending: COLON & RECTAL SURGERY | Admitting: COLON & RECTAL SURGERY
Payer: COMMERCIAL

## 2020-11-16 VITALS
BODY MASS INDEX: 20.06 KG/M2 | WEIGHT: 153 LBS | OXYGEN SATURATION: 98 % | SYSTOLIC BLOOD PRESSURE: 108 MMHG | HEART RATE: 66 BPM | RESPIRATION RATE: 8 BRPM | DIASTOLIC BLOOD PRESSURE: 72 MMHG

## 2020-11-16 LAB — COLONOSCOPY: NORMAL

## 2020-11-16 PROCEDURE — 250N000011 HC RX IP 250 OP 636: Performed by: COLON & RECTAL SURGERY

## 2020-11-16 PROCEDURE — 45380 COLONOSCOPY AND BIOPSY: CPT | Mod: PT | Performed by: COLON & RECTAL SURGERY

## 2020-11-16 PROCEDURE — 88305 TISSUE EXAM BY PATHOLOGIST: CPT | Mod: TC | Performed by: COLON & RECTAL SURGERY

## 2020-11-16 PROCEDURE — 88305 TISSUE EXAM BY PATHOLOGIST: CPT | Mod: 26 | Performed by: PATHOLOGY

## 2020-11-16 PROCEDURE — G0500 MOD SEDAT ENDO SERVICE >5YRS: HCPCS | Performed by: COLON & RECTAL SURGERY

## 2020-11-16 PROCEDURE — 258N000003 HC RX IP 258 OP 636: Performed by: COLON & RECTAL SURGERY

## 2020-11-16 RX ORDER — LIDOCAINE 40 MG/G
CREAM TOPICAL
Status: DISCONTINUED | OUTPATIENT
Start: 2020-11-16 | End: 2020-11-16 | Stop reason: HOSPADM

## 2020-11-16 RX ORDER — SODIUM CHLORIDE 9 MG/ML
INJECTION, SOLUTION INTRAVENOUS CONTINUOUS PRN
Status: DISCONTINUED | OUTPATIENT
Start: 2020-11-16 | End: 2020-11-16 | Stop reason: HOSPADM

## 2020-11-16 RX ORDER — ONDANSETRON 2 MG/ML
4 INJECTION INTRAMUSCULAR; INTRAVENOUS
Status: DISCONTINUED | OUTPATIENT
Start: 2020-11-16 | End: 2020-11-16 | Stop reason: HOSPADM

## 2020-11-16 RX ORDER — FENTANYL CITRATE 50 UG/ML
INJECTION, SOLUTION INTRAMUSCULAR; INTRAVENOUS PRN
Status: DISCONTINUED | OUTPATIENT
Start: 2020-11-16 | End: 2020-11-16 | Stop reason: HOSPADM

## 2020-11-18 LAB — COPATH REPORT: NORMAL

## 2021-01-03 ENCOUNTER — HEALTH MAINTENANCE LETTER (OUTPATIENT)
Age: 41
End: 2021-01-03

## 2021-03-22 ENCOUNTER — TELEPHONE (OUTPATIENT)
Dept: FAMILY MEDICINE | Facility: CLINIC | Age: 41
End: 2021-03-22

## 2021-03-22 NOTE — TELEPHONE ENCOUNTER
Pt called in with symptoms of GI bug  Daughter had symptoms Saturday 13 and threw up Monday 15  They took covid tests, negative    Pt started having symptoms Friday  Fever 100.4- now gone  This weekend he was nauseous with all meals   Denies vomiting  Today he had watery diarrhea x1  Still urinating normally, no signs of dehydration  Denies abdominal pain, reports it as discomfort from nausea  He does not like sticking to the BRAT diet and wants more vegetables and protein but advised he stick with the BRAT diet for the next couple days and if he is still having symptoms to call back later this week    Dixon OLIVA RN

## 2021-10-10 ENCOUNTER — HEALTH MAINTENANCE LETTER (OUTPATIENT)
Age: 41
End: 2021-10-10

## 2021-11-03 ENCOUNTER — OFFICE VISIT (OUTPATIENT)
Dept: FAMILY MEDICINE | Facility: CLINIC | Age: 41
End: 2021-11-03
Payer: COMMERCIAL

## 2021-11-03 VITALS
OXYGEN SATURATION: 99 % | RESPIRATION RATE: 16 BRPM | HEIGHT: 73 IN | DIASTOLIC BLOOD PRESSURE: 67 MMHG | SYSTOLIC BLOOD PRESSURE: 118 MMHG | BODY MASS INDEX: 20.54 KG/M2 | WEIGHT: 155 LBS | TEMPERATURE: 97.6 F | HEART RATE: 63 BPM

## 2021-11-03 DIAGNOSIS — K06.9 GUM DISEASE: ICD-10-CM

## 2021-11-03 DIAGNOSIS — Z00.00 ROUTINE HISTORY AND PHYSICAL EXAMINATION OF ADULT: Primary | ICD-10-CM

## 2021-11-03 PROCEDURE — 99396 PREV VISIT EST AGE 40-64: CPT | Performed by: INTERNAL MEDICINE

## 2021-11-03 ASSESSMENT — ENCOUNTER SYMPTOMS
HEMATOCHEZIA: 0
ARTHRALGIAS: 1
FEVER: 0
CHILLS: 0
DIZZINESS: 0
NAUSEA: 0
MYALGIAS: 1
SHORTNESS OF BREATH: 0
COUGH: 0
ABDOMINAL PAIN: 0
FREQUENCY: 0
DIARRHEA: 0
HEARTBURN: 0
PARESTHESIAS: 0
WEAKNESS: 0
EYE PAIN: 0
HEMATURIA: 0
NERVOUS/ANXIOUS: 1
PALPITATIONS: 0
HEADACHES: 1
DYSURIA: 0
CONSTIPATION: 0
SORE THROAT: 0
JOINT SWELLING: 0

## 2021-11-03 ASSESSMENT — MIFFLIN-ST. JEOR: SCORE: 1665.61

## 2021-11-03 NOTE — PROGRESS NOTES
SUBJECTIVE:   CC: Cameron Garza is an 40 year old male who presents for preventative health visit.       Patient has been advised of split billing requirements and indicates understanding: Yes  Healthy Habits:     Getting at least 3 servings of Calcium per day:  Yes    Bi-annual eye exam:  Yes    Dental care twice a year:  NO    Sleep apnea or symptoms of sleep apnea:  None    Diet:  Other    Frequency of exercise:  None    Taking medications regularly:  No    Barriers to taking medications:  Not applicable    Medication side effects:  None    PHQ-2 Total Score: 0    Additional concerns today:  Yes              Today's PHQ-2 Score:   PHQ-2 ( 1999 Pf[employer]) 11/3/2021   Q1: Little interest or pleasure in doing things 0   Q2: Feeling down, depressed or hopeless 0   PHQ-2 Score 0   Q1: Little interest or pleasure in doing things Not at all   Q2: Feeling down, depressed or hopeless Not at all   PHQ-2 Score 0       Abuse: Current or Past(Physical, Sexual or Emotional)- No  Do you feel safe in your environment? Yes    Have you ever done Advance Care Planning? (For example, a Health Directive, POLST, or a discussion with a medical provider or your loved ones about your wishes): No, advance care planning information given to patient to review.  Patient declined advance care planning discussion at this time.    Social History     Tobacco Use     Smoking status: Never Smoker     Smokeless tobacco: Never Used   Substance Use Topics     Alcohol use: Yes     Comment: rare      If you drink alcohol do you typically have >3 drinks per day or >7 drinks per week? No    Alcohol Use 11/3/2021   Prescreen: >3 drinks/day or >7 drinks/week? No   Prescreen: >3 drinks/day or >7 drinks/week? -       Last PSA:   PSA   Date Value Ref Range Status   09/30/2020 0.84 0 - 4 ug/L Final     Comment:     Assay Method:  Chemiluminescence using Siemens Vista analyzer       Reviewed orders with patient. Reviewed health maintenance and updated orders  "accordingly - Yes  Labs reviewed in EPIC    Reviewed and updated as needed this visit by clinical staff                 Reviewed and updated as needed this visit by Provider                Past Medical History:   Diagnosis Date     Family history of colorectal cancer         Review of Systems  CONSTITUTIONAL: NEGATIVE for fever, chills, change in weight  INTEGUMENTARY/SKIN: NEGATIVE for worrisome rashes, moles or lesions  EYES: NEGATIVE for vision changes or irritation  ENT: NEGATIVE for ear, mouth and throat problems  RESP: NEGATIVE for significant cough or SOB  CV: NEGATIVE for chest pain, palpitations or peripheral edema  GI: NEGATIVE for nausea, abdominal pain, heartburn, or change in bowel habits   male: negative for dysuria, hematuria, decreased urinary stream, erectile dysfunction, urethral discharge  MUSCULOSKELETAL: NEGATIVE for significant arthralgias or myalgia  NEURO: NEGATIVE for weakness, dizziness or paresthesias  PSYCHIATRIC: NEGATIVE for changes in mood or affect    OBJECTIVE:   /67 (BP Location: Right arm, Patient Position: Sitting, Cuff Size: Adult Regular)   Pulse 63   Temp 97.6  F (36.4  C) (Temporal)   Resp 16   Ht 1.86 m (6' 1.23\")   Wt 70.3 kg (155 lb)   SpO2 99%   BMI 20.32 kg/m      Physical Exam  GENERAL: healthy, alert and no distress  EYES: Eyes grossly normal to inspection, PERRL and conjunctivae and sclerae normal  HENT: ear canals and TM's normal, nose and mouth without ulcers or lesions  NECK: no adenopathy, no asymmetry, masses, or scars and thyroid normal to palpation  RESP: lungs clear to auscultation - no rales, rhonchi or wheezes  CV: regular rate and rhythm, normal S1 S2, no S3 or S4, no murmur, click or rub, no peripheral edema and peripheral pulses strong  ABDOMEN: soft, nontender, no hepatosplenomegaly, no masses and bowel sounds normal  MS: no gross musculoskeletal defects noted, no edema  SKIN: no suspicious lesions or rashes  NEURO: Normal strength and " "tone, mentation intact and speech normal  PSYCH: mentation appears normal, affect normal/bright    Diagnostic Test Results:  Labs reviewed in Epic    ASSESSMENT/PLAN:   Cameron was seen today for physical.    Diagnoses and all orders for this visit:    Routine history and physical examination of adult  - continue diet, exercise    Gum disease  - continue dentist clinic follow up going forward.      Patient has been advised of split billing requirements and indicates understanding: Yes  COUNSELING:   Reviewed preventive health counseling, as reflected in patient instructions  Special attention given to:        Regular exercise       Healthy diet/nutrition    Estimated body mass index is 20.06 kg/m  as calculated from the following:    Height as of 10/1/20: 1.86 m (6' 1.23\").    Weight as of 11/16/20: 69.4 kg (153 lb).         He reports that he has never smoked. He has never used smokeless tobacco.      Counseling Resources:  ATP IV Guidelines  Pooled Cohorts Equation Calculator  FRAX Risk Assessment  ICSI Preventive Guidelines  Dietary Guidelines for Americans, 2010  USDA's MyPlate  ASA Prophylaxis  Lung CA Screening    Shanon Hodges MD  [facility]  "

## 2021-11-03 NOTE — LETTER
November 3, 2021      Cameron Garza  [address]   [place] [address]        To Whom It May Concern:    Cameron Garza was seen in our clinic. Please excuse Cameron from mask wearing requirement due to medical condition of early onset gum disease.        Sincerely,            Shanon Hodges MD

## 2021-12-15 ENCOUNTER — OFFICE VISIT (OUTPATIENT)
Dept: FAMILY MEDICINE | Facility: CLINIC | Age: 41
End: 2021-12-15
Payer: COMMERCIAL

## 2021-12-15 VITALS
BODY MASS INDEX: 20.41 KG/M2 | DIASTOLIC BLOOD PRESSURE: 56 MMHG | SYSTOLIC BLOOD PRESSURE: 102 MMHG | OXYGEN SATURATION: 98 % | HEART RATE: 72 BPM | HEIGHT: 73 IN | RESPIRATION RATE: 12 BRPM | WEIGHT: 154 LBS | TEMPERATURE: 97.9 F

## 2021-12-15 DIAGNOSIS — L84 CALLUS OF FOOT: Primary | ICD-10-CM

## 2021-12-15 PROCEDURE — 99213 OFFICE O/P EST LOW 20 MIN: CPT | Performed by: FAMILY MEDICINE

## 2021-12-15 ASSESSMENT — MIFFLIN-ST. JEOR: SCORE: 1657.18

## 2021-12-15 NOTE — PROGRESS NOTES
Assessment & Plan   Problem List Items Addressed This Visit        Musculoskeletal and Integumentary    Callus of foot - Primary     There is a very minor, difficult to see fine crack in the patient's right heel measuring approximately 1 cm in length and less than a millimeter in width.  There is no redness, warmth, swelling, or purulent drainage.  The area is nontender unless it is touched.    The patient does have callus on his heel and the heel appears dry.  At the time of exam his feet also do appear slightly moist and sweaty.  In addition, the patient has thickened long toenails on both feet.    I have recommended that he use Goldbond powder during the day as the sweaty status of his feet may indicate that he has too much moisture around which may be aggravating the skin of his feet.  At the end of the day I have recommended that he wash his feet and dry them completely and then apply Aveeno lotion massaging that into his calluses and Lamisil cream to the toenails which are long and thickened              Patient Instructions   Foot care -   Take a look at your feet carefully after washing and drying them everyday before bed and massage any calloused areas with lotion and apply socks to avoid slipping after lotion is applied.     In the mornings use gold bond powder in socks and shoes to prevent sweaty feet.       Return if symptoms worsen or fail to improve.    Lizzy Marcelo MD  [facility]    Chief Complaint   Patient presents with     Puncture Wound     left punctured/open skin in the left heel in the last 24 hrs, washed it off with water, before washing off, feel sharp pain, purple in color, last tenus shot was 2013        Subjective   Cameron is a 41 year old who presents for the following health issues - worried about a crack on his heel that he first noticed yesterday. He actually could not really see anything wrong with his foot but it was a little painful on his right heel.  " So he took a picture of the area and zooming and he could see a small crack in the callused skin of his heel.  This was the same area that was painful.    He is now worried that this may be indication that he got some sort of puncture wound and that he needs his tetanus shot updated.          Objective    /56 (BP Location: Left arm, Patient Position: Sitting, Cuff Size: Adult Regular)   Pulse 72   Temp 97.9  F (36.6  C) (Oral)   Resp 12   Ht 1.862 m (6' 1.3\")   Wt 69.9 kg (154 lb)   SpO2 98%   BMI 20.15 kg/m    Body mass index is 20.15 kg/m .  Physical Exam  Constitutional:       Appearance: Normal appearance.   HENT:      Head: Normocephalic and atraumatic.   Cardiovascular:      Rate and Rhythm: Normal rate and regular rhythm.   Pulmonary:      Effort: Pulmonary effort is normal.   Musculoskeletal:         General: Normal range of motion.      Cervical back: Normal range of motion and neck supple.      Right foot: Normal range of motion. No deformity, bunion, Charcot foot, foot drop or prominent metatarsal heads.      Left foot: Normal range of motion. No deformity, bunion, Charcot foot, foot drop or prominent metatarsal heads.   Feet:      Right foot:      Skin integrity: Callus, dry skin and fissure present. No ulcer, blister, skin breakdown, erythema or warmth.      Toenail Condition: Right toenails are abnormally thick and long.      Left foot:      Skin integrity: Callus present. No ulcer, blister, skin breakdown, erythema, warmth, dry skin or fissure.      Toenail Condition: Left toenails are abnormally thick and long.   Neurological:      General: No focal deficit present.      Mental Status: He is alert.                "

## 2021-12-15 NOTE — PATIENT INSTRUCTIONS
Foot care -   Take a look at your feet carefully after washing and drying them everyday before bed and massage any calloused areas with lotion and apply socks to avoid slipping after lotion is applied.     In the mornings use gold bond powder in socks and shoes to prevent sweaty feet.

## 2021-12-15 NOTE — ASSESSMENT & PLAN NOTE
There is a very minor, difficult to see fine crack in the patient's right heel measuring approximately 1 cm in length and less than a millimeter in width.  There is no redness, warmth, swelling, or purulent drainage.  The area is nontender unless it is touched.    The patient does have callus on his heel and the heel appears dry.  At the time of exam his feet also do appear slightly moist and sweaty.  In addition, the patient has thickened long toenails on both feet.    I have recommended that he use Goldbond powder during the day as the sweaty status of his feet may indicate that he has too much moisture around which may be aggravating the skin of his feet.  At the end of the day I have recommended that he wash his feet and dry them completely and then apply Aveeno lotion massaging that into his calluses and Lamisil cream to the toenails which are long and thickened

## 2022-04-08 ENCOUNTER — HOSPITAL ENCOUNTER (EMERGENCY)
Facility: CLINIC | Age: 42
Discharge: HOME | End: 2022-04-08
Attending: FAMILY MEDICINE | Admitting: FAMILY MEDICINE
Payer: COMMERCIAL

## 2022-04-08 VITALS
RESPIRATION RATE: 20 BRPM | BODY MASS INDEX: 20.15 KG/M2 | WEIGHT: 154 LBS | DIASTOLIC BLOOD PRESSURE: 78 MMHG | TEMPERATURE: 98.6 F | OXYGEN SATURATION: 98 % | SYSTOLIC BLOOD PRESSURE: 119 MMHG | HEART RATE: 73 BPM

## 2022-04-08 DIAGNOSIS — J39.2 THROAT IRRITATION: ICD-10-CM

## 2022-04-08 DIAGNOSIS — J30.2 SEASONAL ALLERGIC RHINITIS, UNSPECIFIED TRIGGER: ICD-10-CM

## 2022-04-08 PROCEDURE — 99282 EMERGENCY DEPT VISIT SF MDM: CPT | Performed by: FAMILY MEDICINE

## 2022-04-08 RX ORDER — LORATADINE 10 MG/1
10 TABLET ORAL DAILY
Qty: 30 TABLET | Refills: 0 | COMMUNITY
Start: 2022-04-08

## 2022-04-08 RX ORDER — FLUTICASONE PROPIONATE 50 MCG
2 SPRAY, SUSPENSION (ML) NASAL DAILY
Refills: 0 | COMMUNITY
Start: 2022-04-08

## 2022-04-09 NOTE — ED PROVIDER NOTES
History     Chief Complaint   Patient presents with     Swallowed Foreign Body     HPI  Cameron Garza is a 41 year old male who presents to the ED tonight with concerns about a possible foreign body in his throat.  He was eating some baked fish, potatoes, corn and coleslaw tonight.  He was done eating and about 5 minutes later while he was walking felt a small piece of food fall down from the back of his throat and he felt like it got stuck in his esophagus.  He drank some water and that went down just fine.  Then he ate some bread to see if he could push what ever was caught down further.  Still has a sensation.  He then became concerned that it might be in his trachea.  He called nurse triage and they asked him how he was doing as far as his breathing goes and then he thought maybe he was having some slight wheezing.  He did cough up some phlegm and a small piece of food was in there.  He has no underlying respiratory issues.  Otherwise in good health.  No meds.  No surgeries.  Describes some difficulties with seasonal allergies especially this time of the year.  Has used Claritin in the past.  Has lots of drainage from his nose down the back of his throat.      Allergies:  Allergies   Allergen Reactions     Amoxicillin Hives       Problem List:    Patient Active Problem List    Diagnosis Date Noted     Callus of foot 12/15/2021     Priority: Medium     Paresthesia 10/17/2019     Priority: Medium     Anemia, unspecified type 10/17/2019     Priority: Medium     Parotid gland enlargement 10/17/2019     Priority: Medium     Family history of colon cancer 10/17/2019     Priority: Medium     mother       Low HDL (under 40) 09/08/2016     Priority: Medium     OCD (obsessive compulsive disorder) 09/28/2012     Priority: Medium     Health care maintenance 01/12/2009     Priority: Medium     Formatting of this note might be different from the original.  Colonoscopy  Bone Density  Last Lipids:  Chol: 157    1/22/04  TG:            HDL:           LDL:          GLUCOSE                   (mg/dL)  Date             Value     1/22/04          89   ----------       Anxiety state 11/07/2006     Priority: Medium     Allergic rhinitis 11/07/2006     Priority: Medium     Formatting of this note might be different from the original.  cats and dogs.       Panic disorder without agoraphobia 11/07/2006     Priority: Medium        Past Medical History:    Past Medical History:   Diagnosis Date     Family history of colorectal cancer        Past Surgical History:    Past Surgical History:   Procedure Laterality Date     AS REMOVAL OF TONSILS,<10 Y/O       COLONOSCOPY N/A 11/16/2020    Procedure: COLONOSCOPY, WITH POLYPECTOMY AND BIOPSY;  Surgeon: Mary Jane Daugherty MD;  Location:  GI       Family History:    Family History   Problem Relation Age of Onset     Colon Cancer Mother      Family History Negative Father        Social History:  Marital Status:  Single [1]  Social History     Tobacco Use     Smoking status: Never Smoker     Smokeless tobacco: Never Used   Substance Use Topics     Alcohol use: Yes     Comment: rare      Drug use: No        Medications:    fluticasone (FLONASE) 50 MCG/ACT nasal spray  loratadine (CLARITIN) 10 MG tablet  Multiple Vitamins-Minerals (MULTIVITAMIN ADULT PO)  Omega-3 Fatty Acids (FISH OIL) 500 MG CAPS          Review of Systems   All other systems reviewed and are negative.      Physical Exam   BP: 119/78  Pulse: 73  Temp: 98.6  F (37  C)  Resp: 18  Weight: 69.9 kg (154 lb)  SpO2: 98 %      Physical Exam  Constitutional:       General: He is not in acute distress.     Appearance: Normal appearance.   HENT:      Mouth/Throat:      Mouth: Mucous membranes are moist.      Pharynx: Oropharynx is clear.   Cardiovascular:      Rate and Rhythm: Normal rate and regular rhythm.   Pulmonary:      Effort: Pulmonary effort is normal. No respiratory distress.      Breath sounds: Normal breath sounds. No stridor. No  wheezing.   Neurological:      Mental Status: He is alert.   Psychiatric:         Mood and Affect: Mood normal.         ED Course                 Procedures              Critical Care time:  none               No results found for this or any previous visit (from the past 24 hour(s)).    Medications - No data to display    Assessments & Plan (with Medical Decision Making)  41-year-old with some throat irritation after eating tonight.  Felt a small piece of food about the size that would be caught between your teeth, fell down the back of his throat.  Able to drink water without difficulty and had some bread but still had that sensation.  Then he thought maybe had some wheezing.  He coughed up some phlegm with a small piece of food in the phlegm.  He was just concerned that he could have something still stuck in his throat or his trachea.  Also describes some seasonal allergies and allergic rhinitis.  His exam is really unremarkable.  Lungs are clear there is no wheezes or stridor.  From his history, I think a conservative management plan is in order.  He may have some irritation making it feel like this still something stuck in his throat.  If he does have a tiny piece of food either in his esophagus or trachea it should resolve on its own.  He is not coughing or have any respiratory difficulty.  We did discuss his seasonal allergies allergic rhinitis and I suggested Claritin, Flonase and nasal saline irrigation.  He will recheck in clinic if persistent problems and return to ED if he worsens or has any concerns.  Verbal and written discharge instructions given.  He is comfortable with this plan.       I have reviewed the nursing notes.    I have reviewed the findings, diagnosis, plan and need for follow up with the patient.       New Prescriptions    FLUTICASONE (FLONASE) 50 MCG/ACT NASAL SPRAY    Spray 2 sprays into both nostrils daily    LORATADINE (CLARITIN) 10 MG TABLET    Take 1 tablet (10 mg) by mouth daily        Final diagnoses:   Throat irritation   Seasonal allergic rhinitis, unspecified trigger       4/8/2022   [facility] EMERGENCY DEPT     Ayad Slaughter MD  04/08/22 2011

## 2022-04-09 NOTE — DISCHARGE INSTRUCTIONS
Encourage fluids.  Diet as tolerated.  If you have lots of drainage down the back of your nose into your throat, that can cause some irritation as well.  Nasal saline irrigation can be helpful.  [employer] is one brand that makes a kit which includes an irrigation bottle and 50 pre-mixed saline packets.  Use distilled water, not tap water.   Follow the directions in the package.  It sounds like you have some issues with seasonal allergies as well.  Over-the-counter Claritin and Flonase may be helpful as well.  If you did have something stuck in your throat, it is not causing an obstruction.  Sounds like it was a small piece and should resolve on its own.  You also could just have some leftover irritation that makes it feel like there is something in your throat.  If that is the case that should settle down in a couple of days.  I doubt this is in your airway since you are not coughing and your lungs sound good.  Recheck in clinic if persistent problems.  Return to ED if worse/concerns.  It was nice visiting with you tonight.  I hope this settles down quickly for you.    Thank you for choosing [facility]. We appreciate the opportunity to meet your urgent medical needs. Please let us know if we could have done anything to make your stay more satisfying.    After discharge, please closely monitor for any new or worsening symptoms. Return to the Emergency Department if you develop any acute worsening signs or symptoms.    If you had lab work, cultures or imaging studies done during your stay, the final results may still be pending. We will call you if your plan of care needs to change. However, if you are not improving as expected, please follow up with your primary care provider or clinic.     Start any prescription medications that were prescribed to you and take them as directed.     Please see additional handouts that may be pertinent to your condition.

## 2022-06-08 ENCOUNTER — NURSE TRIAGE (OUTPATIENT)
Dept: NURSING | Facility: CLINIC | Age: 42
End: 2022-06-08
Payer: COMMERCIAL

## 2022-09-18 ENCOUNTER — HEALTH MAINTENANCE LETTER (OUTPATIENT)
Age: 42
End: 2022-09-18

## 2022-12-16 ENCOUNTER — NURSE TRIAGE (OUTPATIENT)
Dept: FAMILY MEDICINE | Facility: CLINIC | Age: 42
End: 2022-12-16

## 2022-12-16 NOTE — TELEPHONE ENCOUNTER
Pt had an EKG 2 weeks ago by EMS and was told his heart had an electrical blockage. Pt wants to know how serious is this and how soon he needs to be seen. Pt denies breathing problems, chest pain, dizziness or weakness.Triage advised he be seen to determine severity.. Advised he seek care at ER if any of the symptoms above. Pt agreed to schedule next available appt with team provider.     Reason for Disposition    Palpitations and no improvement after following Care Advice     Pt was informed his heart had an electrical blockage by EMS two weeks ago.    Additional Information    Negative: Passed out (i.e., lost consciousness, collapsed and was not responding)    Negative: Shock suspected (e.g., cold/pale/clammy skin, too weak to stand, low BP, rapid pulse)    Negative: Difficult to awaken or acting confused (e.g., disoriented, slurred speech)    Negative: Visible sweat on face or sweat dripping down face    Negative: Unable to walk, or can only walk with assistance (e.g., requires support)    Negative: Received SHOCK from implantable cardiac defibrillator and has persisting symptoms (i.e., palpitations, lightheadedness)    Negative: Dizziness, lightheadedness, or weakness and heart beating very rapidly (e.g., > 140 / minute)    Negative: Dizziness, lightheadedness, or weakness and heart beating very slowly (e.g., < 50 / minute)    Negative: Sounds like a life-threatening emergency to the triager    Negative: Chest pain    Negative: Difficulty breathing    Negative: Dizziness, lightheadedness, or weakness    Negative: Heart beating very rapidly (e.g., > 140 / minute) and present now  (Exception: During exercise.)    Negative: Heart beating very slowly (e.g., < 50 / minute)  (Exception: Athlete and heart rate normal for caller.)    Negative: New or worsened shortness of breath with activity (dyspnea on exertion)    Negative: Patient sounds very sick or weak to the triager    Negative: Wearing a 'Holter monitor' or  'cardiac event monitor'    Negative: Received SHOCK from implantable cardiac defibrillator (and now feels well)    Negative: Heart beating very rapidly (e.g., > 140 / minute) and not present now  (Exception: During exercise.)    Negative: Skipped or extra beat(s) and increases with exercise or exertion    Negative: Skipped or extra beat(s) and occurs 4 or more times per minute    Negative: History of heart disease (i.e., heart attack, bypass surgery, angina, angioplasty)  (Exception: Brief heartbeat symptoms that went away and now feels well.)    Negative: Age > 60 years  (Exception: Brief heartbeat symptoms that went away and now feels well.)    Negative: Taking water pill (i.e., diuretic) or heart medication (e.g., digoxin)    Negative: Patient wants to be seen    Negative: Heart rhythm alert (e.g., 'you have irregular heartbeat') from personal wearable device (e.g., Apple Watch)    Negative: History of hyperthyroidism or taking thyroid medication    Negative: Substance use (drug use) or misuse, known or suspected    Negative: ADHD and taking stimulant medication    Protocols used: HEART RATE AND HEARTBEAT [id number]

## 2022-12-16 NOTE — TELEPHONE ENCOUNTER
LINO garcia with writer and asked if I can triage pt. Triage left voice message asking him to call triage back. On call back please triage heart concerns.

## 2022-12-16 NOTE — TELEPHONE ENCOUNTER
Reason for Call:  Same Day Appointment, Requested Provider:  Shanon Hodges    PCP: Shanon Hodges    Reason for visit: Follow Up about an EKG & Physical    Duration of symptoms: EKG was 2 weeks ago    Have you been treated for this in the past?     Additional comments: He was given a EKG test and was told his heart had an electrical blockage and he wanted to do a physical to check him over following up on the EKG     Can we leave a detailed message on this number? YES    Phone number patient can be reached at: Cell number on file:    Telephone Information:   Mobile [phone]       Best Time: any    Call taken on 12/16/2022 at 10:38 AM by Ira Sunshine

## 2022-12-19 ENCOUNTER — OFFICE VISIT (OUTPATIENT)
Dept: FAMILY MEDICINE | Facility: CLINIC | Age: 42
End: 2022-12-19
Payer: COMMERCIAL

## 2022-12-19 VITALS
OXYGEN SATURATION: 99 % | HEIGHT: 73 IN | BODY MASS INDEX: 21.71 KG/M2 | HEART RATE: 65 BPM | SYSTOLIC BLOOD PRESSURE: 121 MMHG | WEIGHT: 163.8 LBS | DIASTOLIC BLOOD PRESSURE: 74 MMHG | TEMPERATURE: 98.2 F | RESPIRATION RATE: 16 BRPM

## 2022-12-19 DIAGNOSIS — I49.9 CARDIAC ARRHYTHMIA, UNSPECIFIED CARDIAC ARRHYTHMIA TYPE: Primary | ICD-10-CM

## 2022-12-19 PROCEDURE — 93000 ELECTROCARDIOGRAM COMPLETE: CPT | Performed by: NURSE PRACTITIONER

## 2022-12-19 PROCEDURE — 99213 OFFICE O/P EST LOW 20 MIN: CPT | Performed by: NURSE PRACTITIONER

## 2022-12-19 ASSESSMENT — PAIN SCALES - GENERAL: PAINLEVEL: NO PAIN (0)

## 2022-12-19 NOTE — PROGRESS NOTES
"  Assessment & Plan   Problem List Items Addressed This Visit    None  Visit Diagnoses     Cardiac arrhythmia, unspecified cardiac arrhythmia type    -  Primary    Relevant Orders    EKG 12-lead complete w/read - Clinics (Completed)           Told he had an electrical issue by EMS but was told it wasn't anything to worry about. Unknown what the abnormality was. EKG is normal today without acute findings or blocks. No further workup needed. Follow-up olya Flores, MITESH CNP  [facility] RAJ Barnes is a 42 year old, presenting for the following health issues:  No chief complaint on file.      HPI     Follow up on abnormal EKG that was done by EMS  Woke up a couple weeks ago from a bad nightmare during the night, also went to bed with more blankets. Woke up sweaty and tingling of left side of his body. Now he thinks he was just having a panic attack.   Called EMS and they told him he has a minor electrical delay but nothing to worry about   He is here today wondering what is wrong with his heart.   He is feeling fine today       Review of Systems   Detailed as above         Objective    /74 (BP Location: Right arm, Patient Position: Sitting, Cuff Size: Adult Regular)   Pulse 65   Temp 98.2  F (36.8  C)   Resp 16   Ht 1.862 m (6' 1.3\")   Wt 74.3 kg (163 lb 12.8 oz)   SpO2 99%   BMI 21.43 kg/m    There is no height or weight on file to calculate BMI.  Physical Exam  Constitutional:       Appearance: Normal appearance.   Pulmonary:      Effort: Pulmonary effort is normal.   Neurological:      Mental Status: He is alert.   Psychiatric:         Mood and Affect: Mood normal.                    "

## 2023-01-28 ENCOUNTER — HEALTH MAINTENANCE LETTER (OUTPATIENT)
Age: 43
End: 2023-01-28

## 2023-05-24 ENCOUNTER — APPOINTMENT (OUTPATIENT)
Dept: CARDIOLOGY | Facility: CLINIC | Age: 43
End: 2023-05-24
Attending: EMERGENCY MEDICINE
Payer: COMMERCIAL

## 2023-05-24 ENCOUNTER — HOSPITAL ENCOUNTER (EMERGENCY)
Facility: CLINIC | Age: 43
Discharge: HOME | End: 2023-05-24
Attending: EMERGENCY MEDICINE | Admitting: EMERGENCY MEDICINE
Payer: COMMERCIAL

## 2023-05-24 VITALS
SYSTOLIC BLOOD PRESSURE: 131 MMHG | TEMPERATURE: 97.4 F | HEART RATE: 65 BPM | DIASTOLIC BLOOD PRESSURE: 83 MMHG | OXYGEN SATURATION: 97 % | RESPIRATION RATE: 12 BRPM | WEIGHT: 155 LBS | BODY MASS INDEX: 19.89 KG/M2 | HEIGHT: 74 IN

## 2023-05-24 DIAGNOSIS — R00.2 PALPITATIONS: ICD-10-CM

## 2023-05-24 LAB
ANION GAP SERPL CALCULATED.3IONS-SCNC: 7 MMOL/L (ref 7–15)
ATRIAL RATE - MUSE: 72 BPM
BASOPHILS # BLD AUTO: 0 10E3/UL (ref 0–0.2)
BASOPHILS NFR BLD AUTO: 0 %
BUN SERPL-MCNC: 15.9 MG/DL (ref 6–20)
CALCIUM SERPL-MCNC: 9.1 MG/DL (ref 8.6–10)
CHLORIDE SERPL-SCNC: 100 MMOL/L (ref 98–107)
CREAT SERPL-MCNC: 1.17 MG/DL (ref 0.67–1.17)
DEPRECATED HCO3 PLAS-SCNC: 31 MMOL/L (ref 22–29)
DIASTOLIC BLOOD PRESSURE - MUSE: NORMAL MMHG
EOSINOPHIL # BLD AUTO: 0.2 10E3/UL (ref 0–0.7)
EOSINOPHIL NFR BLD AUTO: 3 %
ERYTHROCYTE [DISTWIDTH] IN BLOOD BY AUTOMATED COUNT: 12.7 % (ref 10–15)
GFR SERPL CREATININE-BSD FRML MDRD: 79 ML/MIN/1.73M2
GLUCOSE SERPL-MCNC: 104 MG/DL (ref 70–99)
HCT VFR BLD AUTO: 43.1 % (ref 40–53)
HGB BLD-MCNC: 14.2 G/DL (ref 13.3–17.7)
HOLD SPECIMEN: NORMAL
HOLD SPECIMEN: NORMAL
IMM GRANULOCYTES # BLD: 0 10E3/UL
IMM GRANULOCYTES NFR BLD: 0 %
INTERPRETATION ECG - MUSE: NORMAL
LYMPHOCYTES # BLD AUTO: 1.3 10E3/UL (ref 0.8–5.3)
LYMPHOCYTES NFR BLD AUTO: 20 %
MCH RBC QN AUTO: 28.9 PG (ref 26.5–33)
MCHC RBC AUTO-ENTMCNC: 32.9 G/DL (ref 31.5–36.5)
MCV RBC AUTO: 88 FL (ref 78–100)
MONOCYTES # BLD AUTO: 0.5 10E3/UL (ref 0–1.3)
MONOCYTES NFR BLD AUTO: 8 %
NEUTROPHILS # BLD AUTO: 4.5 10E3/UL (ref 1.6–8.3)
NEUTROPHILS NFR BLD AUTO: 69 %
NRBC # BLD AUTO: 0 10E3/UL
NRBC BLD AUTO-RTO: 0 /100
P AXIS - MUSE: 79 DEGREES
PLATELET # BLD AUTO: 193 10E3/UL (ref 150–450)
POTASSIUM SERPL-SCNC: 4.3 MMOL/L (ref 3.4–5.3)
PR INTERVAL - MUSE: 132 MS
QRS DURATION - MUSE: 100 MS
QT - MUSE: 384 MS
QTC - MUSE: 420 MS
R AXIS - MUSE: 82 DEGREES
RBC # BLD AUTO: 4.91 10E6/UL (ref 4.4–5.9)
SODIUM SERPL-SCNC: 138 MMOL/L (ref 136–145)
SYSTOLIC BLOOD PRESSURE - MUSE: NORMAL MMHG
T AXIS - MUSE: 53 DEGREES
TROPONIN T SERPL HS-MCNC: <6 NG/L
TSH SERPL DL<=0.005 MIU/L-ACNC: 1.6 UIU/ML (ref 0.3–4.2)
VENTRICULAR RATE- MUSE: 72 BPM
WBC # BLD AUTO: 6.5 10E3/UL (ref 4–11)

## 2023-05-24 PROCEDURE — 93005 ELECTROCARDIOGRAM TRACING: CPT

## 2023-05-24 PROCEDURE — 93242 EXT ECG>48HR<7D RECORDING: CPT

## 2023-05-24 PROCEDURE — 99284 EMERGENCY DEPT VISIT MOD MDM: CPT

## 2023-05-24 PROCEDURE — 85025 COMPLETE CBC W/AUTO DIFF WBC: CPT | Performed by: EMERGENCY MEDICINE

## 2023-05-24 PROCEDURE — 36415 COLL VENOUS BLD VENIPUNCTURE: CPT | Performed by: EMERGENCY MEDICINE

## 2023-05-24 PROCEDURE — 84484 ASSAY OF TROPONIN QUANT: CPT | Performed by: EMERGENCY MEDICINE

## 2023-05-24 PROCEDURE — 80048 BASIC METABOLIC PNL TOTAL CA: CPT | Performed by: EMERGENCY MEDICINE

## 2023-05-24 PROCEDURE — 84443 ASSAY THYROID STIM HORMONE: CPT | Performed by: EMERGENCY MEDICINE

## 2023-05-24 PROCEDURE — 99285 EMERGENCY DEPT VISIT HI MDM: CPT

## 2023-05-24 ASSESSMENT — ACTIVITIES OF DAILY LIVING (ADL): ADLS_ACUITY_SCORE: 35

## 2023-05-24 NOTE — ED PROVIDER NOTES
"    History     Chief Complaint:  Palpitations       The history is provided by the patient.      Cameron Garza is a 42 year old male with a history of anxiety, panic disorder, and OCD who presents with an episode of palpitations. Earlier today at approximately 1500, he had just returned to work from eating when he had back-to-back episodes of a \"strong heart beat\" with each one only lasting a few seconds. Also, he had some slight chest pain and lightheadedness around this time. He eventually called a nursing hotline, and was advised to go to the ED. He does not have a past medical history or family history of arrhythmias, blood clots, or acid reflux. There has been no recent long distance travels or surgeries. His oral intake has been appropriate lately. He denies dizziness, shortness of breath, nausea, diaphoresis, pain/swelling in the legs, hematochezia, or melena.     Independent Historian:    None     Review of External Notes:  None       Medications:    The patient denies current use of medications.     Past Medical History:    OCD  Panic disorder w/o agoraphobia   Anxiety     Past Surgical History:    Tonsillectomy   Colonoscopy w/ polypectomy and bx     Physical Exam     Patient Vitals for the past 24 hrs:   BP Temp Temp src Pulse Resp SpO2 Height Weight   05/24/23 1624 123/42 97.4  F (36.3  C) Temporal 77 20 97 % 1.88 m (6' 2\") 70.3 kg (155 lb)        Physical Exam  Constitutional: Well appearing.  HEENT: Atraumatic.  Moist mucous membranes.  Neck: Soft.  Supple.    Cardiac: Regular rate and rhythm.  No murmur or rub.  Respiratory: Clear to auscultation bilaterally.  No respiratory distress.  No wheezing, rhonchi, or rales.  Abdomen: Soft and nontender.  No rebound or guarding.  Nondistended.  Musculoskeletal: No edema.  Normal range of motion.  Neurologic: Alert and oriented.  Normal tone and bulk.    Skin: No rashes.  No edema.  Psych: Normal affect.  Normal behavior.      Emergency Department Course "   ECG  ECG taken at 1622, ECG read at 1625  Normal sinus rhythm   Incomplete right bundle branch block  Borderline ECG   Rate 72 bpm. TX interval 132 ms. QRS duration 100 ms. QT/QTc 384/420 ms. P-R-T axes 79 82 53.    Imaging:  Leadless EKG Monitor 3 to 7 Days      Results Pending    Report per radiology    Laboratory:  Labs Ordered and Resulted from Time of ED Arrival to Time of ED Departure   BASIC METABOLIC PANEL - Abnormal       Result Value    Sodium 138      Potassium 4.3      Chloride 100      Carbon Dioxide (CO2) 31 (*)     Anion Gap 7      Urea Nitrogen 15.9      Creatinine 1.17      Calcium 9.1      Glucose 104 (*)     GFR Estimate 79     TROPONIN T, HIGH SENSITIVITY - Normal    Troponin T, High Sensitivity <6     TSH WITH FREE T4 REFLEX - Normal    TSH 1.60     CBC WITH PLATELETS AND DIFFERENTIAL    WBC Count 6.5      RBC Count 4.91      Hemoglobin 14.2      Hematocrit 43.1      MCV 88      MCH 28.9      MCHC 32.9      RDW 12.7      Platelet Count 193      % Neutrophils 69      % Lymphocytes 20      % Monocytes 8      % Eosinophils 3      % Basophils 0      % Immature Granulocytes 0      NRBCs per 100 WBC 0      Absolute Neutrophils 4.5      Absolute Lymphocytes 1.3      Absolute Monocytes 0.5      Absolute Eosinophils 0.2      Absolute Basophils 0.0      Absolute Immature Granulocytes 0.0      Absolute NRBCs 0.0        Emergency Department Course & Assessments:    Interventions:  None      Assessments:  1808 I obtained history and examined the patient as noted above.  1828 I rechecked the patient and explained findings. I discussed plan for discharge home.    Independent Interpretation (X-rays, CTs, rhythm strip):  None    Consultations/Discussion of Management or Tests:  None    Social Determinants of Health affecting care:  None     Disposition:  The patient was discharged to home.     Impression & Plan    Medical Decision Making:  Cameron Garza is a 39-year-old man who is afebrile and  hemodynamically stable.  His EKG demonstrates a normal sinus rhythm with no acute ischemic changes.  His troponin is less than 6 given very atypical symptoms and lasted only few seconds, this excludes ACS.  He declined a chest x-ray.  There is no evidence of PE has he has no pleuritic chest pain, shortness of breath, no signs symptoms of DVT, tachycardia, or hypoxia and he has no risk factors for PE/DVT.  Lab work-up is otherwise unrevealing.  He has no abdominal tenderness to suggest gallbladder or pancreas problem.  We discussed the typical benign nature of palpitations and plan for home with Zio patch which was applied and close primary care follow-up.  Is in agreement.  I see no high risk factors for arrhythmia, however, discussed potential atrial fibrillation with RVR versus SVT versus other diagnosed arrhythmia that he needs follow-up closely with primary care physician.  Is in agreement his questions were answered.  Discussed port of care at home and strict return precautions were given.  Questions were answered and he was in no distress at time of discharge    Diagnosis:    ICD-10-CM    1. Palpitations  R00.2            Scribe Disclosure:  I, Clive Ross, am serving as a scribe at 6:18 PM on 5/24/2023 to document services personally performed by Raúl Faith MD based on my observations and the provider's statements to me.  5/24/2023   Raúl Faith MD Salay, Nicholas J, MD  05/25/23 0135

## 2023-05-24 NOTE — ED NOTES
"Tele-PIT/Intake Evaluation      Video-Visit Details    Type of service:  Video Visit    Video Start Time (time video started): 4:24 PM  Video End Time (time video stopped): 4:27 PM   Originating Location (pt. Location):  [facility]  Distant Location (provider location):  [facility]  Mode of Communication:  Video Conference via dbTwang  Patient verbally consented to [facility] televisit.    History:  Patient presents with 2 episodes of palpitations where he feels like his heart was racing, he relays mild associated left-sided chest discomfort.  He has never had symptoms similar to this in the past.    Exam:  Cardiovascular: Appears well perfused  Lungs: No respiratory distress  Patient Vitals for the past 24 hrs:   BP Temp Temp src Pulse Resp SpO2 Height Weight   05/24/23 1624 123/42 97.4  F (36.3  C) Temporal 77 20 97 % 1.88 m (6' 2\") 70.3 kg (155 lb)       Appropriate interventions for symptom management were initiated if applicable.  Appropriate diagnostic tests were initiated if indicated.    Important information for subsequent clinician:  EKG reviewed demonstrates normal sinus rhythm with normal rate, laboratory work-up was ordered.    I briefly evaluated the patient and developed an initial plan of care. I discussed this plan and explained that this brief interaction does not constitute a full evaluation. Patient/family understands that they should wait to be fully evaluated and discuss any test results with another clinician prior to leaving the hospital.     Trigger, Italo Miner MD  05/24/23 1628    "

## 2023-05-24 NOTE — ED TRIAGE NOTES
Palpitations/ mild L side anterior chest pain.  since lunch. Pt states symptoms started right after eating.     Triage Assessment     Row Name 05/24/23 5513       Triage Assessment (Adult)    Airway WDL WDL       Respiratory WDL    Respiratory WDL WDL       Skin Circulation/Temperature WDL    Skin Circulation/Temperature WDL WDL       Cardiac WDL    Cardiac WDL X  Palpitations       Peripheral/Neurovascular WDL    Peripheral Neurovascular WDL WDL       Cognitive/Neuro/Behavioral WDL    Cognitive/Neuro/Behavioral WDL WDL

## 2023-05-26 ENCOUNTER — PATIENT OUTREACH (OUTPATIENT)
Dept: FAMILY MEDICINE | Facility: CLINIC | Age: 43
End: 2023-05-26

## 2023-05-26 NOTE — TELEPHONE ENCOUNTER
Patient Contact    Attempt # 1    Was call answered? No.    Left message for patient to call triage back.    Diamante Pineda RN

## 2023-05-26 NOTE — TELEPHONE ENCOUNTER
What type of discharge? Emergency Department  Risk of Hospital admission or ED visit: 71%  Is a TCM episode required? No  When should the patient follow up with PCP? N/A.    Diamante Daniel RN  [facility]

## 2023-05-26 NOTE — TELEPHONE ENCOUNTER
"Appointments in Next Year    May 31, 2023  1:30 PM  (Arrive by 1:10 PM)  Adult Preventative Visit with Shayla Akbar MD  [facility] ([facility] ) [phone]          ED/Discharge Protocol    \"Hi, my name is Emily Chambers RN, a registered nurse, and I am calling on behalf of Dr. Hodges's office at [place].  I am calling to follow up and see how things are going for you after your recent visit.\"    \"I see that you were in the (ER/UC/IP) on 5/24/23.    How are you doing now that you are home?\" whatever brought him in, did not happen again. Only pressed the button once    Is patient experiencing symptoms that may require a hospital visit?  No     Discharge Instructions    \"Let's review your discharge instructions.  What is/are the follow-up recommendations?  Pt. Response: follow up with PCP     \"Were you instructed to make a follow-up appointment?\"  Pt. Response - yes as scheduled   \"When you see the provider, I would recommend that you bring your discharge instructions with you.    Medications    \"How many new medications are you on since your hospitalization/ED visit?\"    0-1  \"How many of your current medicines changed (dose, timing, name, etc.) while you were in the hospital/ED visit?\"   0-1  \"Do you have questions about your medications?\"   No  \"Were you newly diagnosed with heart failure, COPD, diabetes or did you have a heart attack?\"   No  For patients on insulin: \"Did you start on insulin in the hospital or did you have your insulin dose changed?\"   No  Post Discharge Medication Reconciliation Status: discharge medications reconciled, continue medications without change.    Was MTM referral placed (*Make sure to put transitions as reason for referral)?   No    Call Summary    \"Do you have any questions or concerns about your condition or care plan at the moment?\"    No  Patient was in ER 3x in the past year (assess appropriateness of ER visits.)      \"If you " "have questions or things don't continue to improve, we encourage you contact us through the main clinic number,  ([phone]).  Even if the clinic is not open, triage nurses are available 24/7 to help you.     We would like you to know that our clinic has extended hours (provide information).  We also have urgent care (provide details on closest location and hours/contact info)\"      \"Thank you for your time and take care!\"    Emily BAE Triage RN  [facility]     "

## 2023-05-31 ENCOUNTER — OFFICE VISIT (OUTPATIENT)
Dept: FAMILY MEDICINE | Facility: CLINIC | Age: 43
End: 2023-05-31
Payer: COMMERCIAL

## 2023-05-31 VITALS
TEMPERATURE: 97.4 F | OXYGEN SATURATION: 100 % | BODY MASS INDEX: 19.89 KG/M2 | RESPIRATION RATE: 22 BRPM | WEIGHT: 155 LBS | SYSTOLIC BLOOD PRESSURE: 111 MMHG | HEIGHT: 74 IN | DIASTOLIC BLOOD PRESSURE: 68 MMHG | HEART RATE: 70 BPM

## 2023-05-31 DIAGNOSIS — R05.2 SUBACUTE COUGH: ICD-10-CM

## 2023-05-31 DIAGNOSIS — Z13.220 LIPID SCREENING: ICD-10-CM

## 2023-05-31 DIAGNOSIS — R09.82 PND (POST-NASAL DRIP): ICD-10-CM

## 2023-05-31 DIAGNOSIS — L29.0 ANAL ITCHING: ICD-10-CM

## 2023-05-31 DIAGNOSIS — Z86.59 HISTORY OF OCD (OBSESSIVE COMPULSIVE DISORDER): ICD-10-CM

## 2023-05-31 DIAGNOSIS — Z80.0 FAMILY HISTORY OF COLON CANCER: ICD-10-CM

## 2023-05-31 DIAGNOSIS — R42 DIZZY SPELLS: ICD-10-CM

## 2023-05-31 DIAGNOSIS — R05.9 COUGH, UNSPECIFIED TYPE: ICD-10-CM

## 2023-05-31 DIAGNOSIS — Z00.00 ROUTINE HISTORY AND PHYSICAL EXAMINATION OF ADULT: Primary | ICD-10-CM

## 2023-05-31 DIAGNOSIS — F41.1 ANXIETY STATE: ICD-10-CM

## 2023-05-31 DIAGNOSIS — R20.9 SENSORY DISORDER: ICD-10-CM

## 2023-05-31 LAB
ALBUMIN SERPL BCG-MCNC: 4.5 G/DL (ref 3.5–5.2)
ALP SERPL-CCNC: 56 U/L (ref 40–129)
ALT SERPL W P-5'-P-CCNC: 17 U/L (ref 10–50)
AST SERPL W P-5'-P-CCNC: 23 U/L (ref 10–50)
BILIRUB DIRECT SERPL-MCNC: <0.2 MG/DL (ref 0–0.3)
BILIRUB SERPL-MCNC: 0.3 MG/DL
CHOLEST SERPL-MCNC: 161 MG/DL
HDLC SERPL-MCNC: 41 MG/DL
LDLC SERPL CALC-MCNC: 89 MG/DL
NONHDLC SERPL-MCNC: 120 MG/DL
PROT SERPL-MCNC: 7.3 G/DL (ref 6.4–8.3)
TRIGL SERPL-MCNC: 155 MG/DL

## 2023-05-31 PROCEDURE — 80076 HEPATIC FUNCTION PANEL: CPT | Performed by: INTERNAL MEDICINE

## 2023-05-31 PROCEDURE — 36415 COLL VENOUS BLD VENIPUNCTURE: CPT | Performed by: INTERNAL MEDICINE

## 2023-05-31 PROCEDURE — 80061 LIPID PANEL: CPT | Performed by: INTERNAL MEDICINE

## 2023-05-31 PROCEDURE — 99396 PREV VISIT EST AGE 40-64: CPT | Performed by: INTERNAL MEDICINE

## 2023-05-31 PROCEDURE — 99214 OFFICE O/P EST MOD 30 MIN: CPT | Mod: 25 | Performed by: INTERNAL MEDICINE

## 2023-05-31 RX ORDER — HYDROCORTISONE 25 MG/G
CREAM TOPICAL 2 TIMES DAILY PRN
Qty: 30 G | Refills: 0 | Status: SHIPPED | OUTPATIENT
Start: 2023-05-31

## 2023-05-31 ASSESSMENT — ENCOUNTER SYMPTOMS
JOINT SWELLING: 0
HEARTBURN: 1
WEAKNESS: 0
DYSURIA: 0
ABDOMINAL PAIN: 0
HEMATOCHEZIA: 0
DIZZINESS: 1
CONSTIPATION: 0
CHILLS: 0
FEVER: 0
FREQUENCY: 0
HEADACHES: 1
SHORTNESS OF BREATH: 0
MYALGIAS: 1
NAUSEA: 0
SORE THROAT: 1
DIARRHEA: 0
PALPITATIONS: 0
ARTHRALGIAS: 1
HEMATURIA: 0
PARESTHESIAS: 0
NERVOUS/ANXIOUS: 1
EYE PAIN: 0
COUGH: 1

## 2023-05-31 ASSESSMENT — PAIN SCALES - GENERAL: PAINLEVEL: NO PAIN (0)

## 2023-05-31 NOTE — PROGRESS NOTES
"SUBJECTIVE:   CC: Cameron is an 42 year old who presents for preventative health visit.     Patient has been advised of split billing requirements and indicates understanding: Yes  Healthy Habits:     Getting at least 3 servings of Calcium per day:  Yes    Bi-annual eye exam:  Yes    Dental care twice a year:  Yes    Sleep apnea or symptoms of sleep apnea:  None    Diet:  Regular (no restrictions)    Frequency of exercise:  1 day/week    Duration of exercise:  45-60 minutes    Taking medications regularly:  Not Applicable    Medication side effects:  Not applicable    PHQ-2 Total Score: 0    Additional concerns today:  Yes        COUGHX 2 WEEKS    TOENAIL FUNGUS    ALLERGIES,     ANAL ITCHING HERE AND THERE, NO BLOOD IN STOOL    HAD DIARRHEA THAT RESOLVED    READ ON Frequency    Pulsating on stomach, read online for aortic aneurysm, ...\"irritating and scaring and frustrating\",     Tall 6'1''   flutters in chest ,       colonoscopy repeat in 5 yrs    Mom had colorectal cancer, 60, vulvar ca  Colon, transverse: Polypectomy:   - Nondysplastic colonic mucosa with prominent lymphoid aggregate, multiple    additional levels examined     No KRIS. Goes to bed too late, at 1 or 3 am, wakes at 7 or 8 am...      Past Medical History:    OCD  Panic disorder w/o agoraphobia   Anxiety      Past Surgical History:    Tonsillectomy   Colonoscopy w/ polypectomy and bx     Social History     Tobacco Use     Smoking status: Never     Smokeless tobacco: Never   Vaping Use     Vaping status: Never Used   Substance Use Topics     Alcohol use: Yes     Comment: rare              5/31/2023     1:17 PM   Alcohol Use   Prescreen: >3 drinks/day or >7 drinks/week? No          View : No data to display.                Last PSA:   PSA   Date Value Ref Range Status   09/30/2020 0.84 0 - 4 ug/L Final     Comment:     Assay Method:  Chemiluminescence using Siemens Vista analyzer       Reviewed orders with patient. Reviewed health maintenance and updated " orders accordingly - Yes  Lab work is in process  Labs reviewed in EPIC  BP Readings from Last 3 Encounters:   05/31/23 111/68   05/24/23 131/83   12/19/22 121/74    Wt Readings from Last 3 Encounters:   05/31/23 70.3 kg (155 lb)   05/24/23 70.3 kg (155 lb)   12/19/22 74.3 kg (163 lb 12.8 oz)                  Patient Active Problem List   Diagnosis     Anxiety state     Paresthesia     Anemia, unspecified type     Parotid gland enlargement     Family history of colon cancer     Allergic rhinitis     Health care maintenance     Low HDL (under 40)     OCD (obsessive compulsive disorder)     Panic disorder without agoraphobia     Callus of foot     Past Surgical History:   Procedure Laterality Date     AS REMOVAL OF TONSILS,<12 Y/O       COLONOSCOPY N/A 11/16/2020    Procedure: COLONOSCOPY, WITH POLYPECTOMY AND BIOPSY;  Surgeon: Mary Jane Daugherty MD;  Location:  GI       Social History     Tobacco Use     Smoking status: Never     Smokeless tobacco: Never   Vaping Use     Vaping status: Never Used   Substance Use Topics     Alcohol use: Yes     Comment: rare      Family History   Problem Relation Age of Onset     Colon Cancer Mother      Family History Negative Father          Current Outpatient Medications   Medication Sig Dispense Refill     fluticasone (FLONASE) 50 MCG/ACT nasal spray Spray 2 sprays into both nostrils daily  0     hydrocortisone, Perianal, (HYDROCORTISONE) 2.5 % cream Place rectally 2 times daily as needed for hemorrhoids 30 g 0     loratadine (CLARITIN) 10 MG tablet Take 1 tablet (10 mg) by mouth daily 30 tablet 0     Multiple Vitamins-Minerals (MULTIVITAMIN ADULT PO)        Omega-3 Fatty Acids (FISH OIL) 500 MG CAPS Take 500 mg by mouth daily       Allergies   Allergen Reactions     Amoxicillin Hives     Recent Labs   Lab Test 05/31/23  1442 05/24/23  1648 09/30/20  1018 08/13/20  1138 10/17/19  1627   A1C  --   --  5.1  --   --    LDL 89  --  97  --   --    HDL 41  --  44  --   --    TRIG  "155*  --  81  --   --    ALT 17  --   --   --   --    CR  --  1.17 1.20 1.13 1.09   GFRESTIMATED  --  79 75 80 85   GFRESTBLACK  --   --  87 >90 >90   POTASSIUM  --  4.3 4.1 4.6 4.7   TSH  --  1.60  --   --  1.49        Reviewed and updated as needed this visit by clinical staff   Tobacco  Allergies  Meds  Problems  Med Hx  Surg Hx  Fam Hx          Reviewed and updated as needed this visit by Provider   Tobacco  Allergies   Problems  Med Hx  Surg Hx  Fam Hx         Past Medical History:   Diagnosis Date     Family history of colorectal cancer       Past Surgical History:   Procedure Laterality Date     AS REMOVAL OF TONSILS,<12 Y/O       COLONOSCOPY N/A 11/16/2020    Procedure: COLONOSCOPY, WITH POLYPECTOMY AND BIOPSY;  Surgeon: Mary Jane Daugherty MD;  Location:  GI       Review of Systems  CONSTITUTIONAL: NEGATIVE for fever, chills, change in weight  INTEGUMENTARY/SKIN: NEGATIVE for worrisome rashes, moles or lesions  EYES: NEGATIVE for vision changes or irritation  ENT: NEGATIVE for ear, mouth and throat problems  RESP: NEGATIVE for significant cough or SOB  CV: NEGATIVE for chest pain, palpitations or peripheral edema  GI: NEGATIVE for nausea, abdominal pain, heartburn, or change in bowel habits   male: negative for dysuria, hematuria, decreased urinary stream, erectile dysfunction, urethral discharge  MUSCULOSKELETAL: NEGATIVE for significant arthralgias or myalgia  NEURO: NEGATIVE for weakness, dizziness or paresthesias  PSYCHIATRIC: NEGATIVE for changes in mood or affect    OBJECTIVE:   /68 (BP Location: Left arm, Patient Position: Sitting, Cuff Size: Adult Regular)   Pulse 70   Temp 97.4  F (36.3  C) (Oral)   Resp 22   Ht 1.88 m (6' 2\")   Wt 70.3 kg (155 lb)   SpO2 100%   BMI 19.90 kg/m      Physical Exam  GENERAL: healthy, alert and no distress, tall, skinny, pleasant  EYES: Eyes grossly normal to inspection, PERRL and conjunctivae and sclerae normal  HENT: ear canals and " TM's normal, nose and mouth without ulcers or lesions  NECK: no adenopathy, no asymmetry, masses, or scars and thyroid normal to palpation  RESP: lungs clear to auscultation - no rales, rhonchi or wheezes  CV: regular rate and rhythm, normal S1 S2, no S3 or S4, no murmur, click or rub, no peripheral edema and peripheral pulses strong  ABDOMEN: soft, nontender, no hepatosplenomegaly, no masses and bowel sounds normal   (male): Small male genitalia without lesions or urethral discharge, no hernia  MS: no gross musculoskeletal defects noted, no edema  SKIN: no suspicious lesions or rashes, dystrophic 2 nails  NEURO: Normal strength and tone, mentation intact and speech normal very sensitive to touch  PSYCH: mentation appears normal, affect normal/bright    Diagnostic Test Results:  Labs reviewed in Epic    ASSESSMENT/PLAN:   Cameron was seen today for physical.    Diagnoses and all orders for this visit:    Routine history and physical examination of adult  -     Hepatic panel (Albumin, ALT, AST, Bili, Alk Phos, TP)    Lipid screening  -     Lipid panel reflex to direct LDL Fasting    Dizzy spells  Comments:  lightheaded symtpoms, ?hydration, intermittent    Anal itching  Comments:  SITZ and anusol, inc fiber  Orders:  -     hydrocortisone, Perianal, (HYDROCORTISONE) 2.5 % cream; Place rectally 2 times daily as needed for hemorrhoids    Family history of colon cancer    Anxiety state    Sensory disorder    Subacute cough    Cough, unspecified type  Comments:  ?2nd to PND vs GERD , not in distress, %, clear lungs,     History of OCD (obsessive compulsive disorder)  Comments:  not on Tx , doing therapy    PND (post-nasal drip)  Comments:  Suspected, use flonase and AntiHistamine daily    Other orders  -     REVIEW OF HEALTH MAINTENANCE PROTOCOL ORDERS      Concern with possible protein malnutrition, increase fluid fluid intake 3 meals a day.  Has some sensory somatization issue, very sensitive to touch.  Checked  rectal area ,could not appreciate any hemorrhoids, again patient was very sensitive to exam.  Try sitz bath's and Anusol cream, increase fiber.  Dizzy spells probably hydration related, advised to increase fluid intake.  He has a Holter monitor does have occasional palpitations that showed long-lasting not associated with any dizziness per se or chest pain.  Has some panic attacks gets couple years and OCD baseline disorder does not want to be medicated.  He does therapy.,  Family history of colon cancer, repeat colonoscopy in 5 years from last 1.  Advised to use Flonase for nasal congestion, he describes some sounds he generates from his throat when he wakes up or after a meal?  His symptoms, reports possible food gets stuck there,?  Underlying , ?tourette given his history of OCD versus postnasal drip vs GERD  related symptoms.  He also needs to use Flonase daily and can use an antihistamine over-the-counter.  He does do dancing salsa etc. he sweats quite a bit and is pretty physically active otherwise.  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  No other concerns. Cough probably due to PND/allergies vs GERD, not in distress, POx 100%    Patient has been advised of split billing requirements and indicates understanding: Yes      COUNSELING:   Reviewed preventive health counseling, as reflected in patient instructions       Regular exercise       Healthy diet/nutrition       Vision screening       Hearing screening       Aspirin prophylaxis        Alcohol Use        Family planning       Safe sex practices/STD prevention       HIV screeninx in teen years, 1x in adult years, and at intervals if high risk        He reports that he has never smoked. He has never used smokeless tobacco.            Shayla Akbar MD  [facility]

## 2023-07-07 ENCOUNTER — OFFICE VISIT (OUTPATIENT)
Dept: URGENT CARE | Facility: URGENT CARE | Age: 43
End: 2023-07-07
Payer: COMMERCIAL

## 2023-07-07 VITALS
SYSTOLIC BLOOD PRESSURE: 107 MMHG | HEART RATE: 70 BPM | OXYGEN SATURATION: 96 % | BODY MASS INDEX: 19.77 KG/M2 | TEMPERATURE: 98.2 F | DIASTOLIC BLOOD PRESSURE: 69 MMHG | WEIGHT: 154 LBS

## 2023-07-07 DIAGNOSIS — H10.33 ACUTE BACTERIAL CONJUNCTIVITIS OF BOTH EYES: Primary | ICD-10-CM

## 2023-07-07 PROCEDURE — 99213 OFFICE O/P EST LOW 20 MIN: CPT | Performed by: EMERGENCY MEDICINE

## 2023-07-07 RX ORDER — POLYMYXIN B SULFATE AND TRIMETHOPRIM 1; 10000 MG/ML; [USP'U]/ML
1-2 SOLUTION OPHTHALMIC EVERY 6 HOURS
Qty: 3 ML | Refills: 0 | Status: SHIPPED | OUTPATIENT
Start: 2023-07-07 | End: 2023-07-12

## 2023-07-07 NOTE — PROGRESS NOTES
Assessment & Plan     Diagnosis:    ICD-10-CM    1. Acute bacterial conjunctivitis of both eyes  H10.33 trimethoprim-polymyxin b (POLYTRIM) 71447-2.1 UNIT/ML-% ophthalmic solution          Medical Decision Making  Cameron Garza is a 42 year old male who presents for evaluation of a red eye.  A broad differential diagnosis was considered including bacterial conjunctivitis, viral conjunctivitis, foreign body, corneal abrasion, chemical vs allergic conjunctivitis, corneal ulcer, HSV, herpes zoster ophthalmicus, orbital cellulitis, etc.  Signs and symptoms consistent with a conjunctivitis, likely bacterial. Will start antibiotics and have close follow-up of eye physician.  No red flag symptoms to suggest any of the above worrisome etiologies.      Patient voices understanding and agreement with the plan including reasons to go to the ER immediately as well as to be seen by a more consistent care-giver, such as their PCP, if the symptoms persist more than 3 days.       Sebastien Zavala PA-C  [facility]    Subjective     Cameron Garza is a 42 year old male who presents to clinic today for the following health issues:  Chief Complaint   Patient presents with     Eye Problem     Started on Wed. States that he has itchy, red, watery eyes.        HPI    Eye Problem    Onset of symptoms was 2 day(s) ago.   Location: both eyes   Course of illness is worsening  Severity moderate  Current and Associated symptoms: discharge, mattering, redness  Treatment measures tried include flushed with water   Context: Pink eye exposure this week from children    Patient and father deny there has been any vision changes, headaches, upper respiratory symptoms including sore throat, cough, fevers.  No history of seasonal allergies, sneezing, runny nose.      Review of Systems    See HPI    Objective      Vitals: /69   Pulse 70   Temp 98.2  F (36.8  C) (Temporal)   Wt 69.9 kg (154 lb)   SpO2 96%   BMI 19.77 kg/m         Patient Vitals for the past 24 hrs:   BP Temp Temp src Pulse SpO2 Weight   07/07/23 1708 107/69 98.2  F (36.8  C) Temporal 70 96 % 69.9 kg (154 lb)       Vital signs reviewed by: Sebastien Zavala PA-C    Physical Exam   Constitutional: Patient is alert. No acute distress.  Mouth: Mucous membranes are moist. Normal tongue and tonsil. Posterior oropharynx is clear.  Eyes: Conjunctivae is injected bilaterally; redness does not cross the limbus. There is mattering/discharge noted at the eyelid margins and at the medial canthi. EOMI are normal. PERRL.   Neurological: Alert. CN 3-7 intact.      Sebastien Zavala PA-C, July 7, 2023

## 2023-07-14 ENCOUNTER — NURSE TRIAGE (OUTPATIENT)
Dept: NURSING | Facility: CLINIC | Age: 43
End: 2023-07-14
Payer: COMMERCIAL

## 2023-07-14 NOTE — TELEPHONE ENCOUNTER
Pt calling with concerns about;    Seen in  on 7/723 for pink eye  States he didn't do eye drops as prescribed and still having sx of pink eye with redness in bilateral eyes  Pt reports itchiness in eyes    Pt Denies;  Fever  Yellow or green eye discharge  Eye pain  Blurry vision    According to the protocol, patient should be able to monitor/manage this at home.  Care advice given. Patient verbalizes understanding and agrees with plan of care.     Lydia Ye RN, Nurse Advisor 11:53 AM 7/14/2023  Reason for Disposition    Red eye is part of a cold, and no eye pain or blurred vision    Additional Information    Negative: Chemical got in the eye    Negative: Piece of something got in the eye    Negative: Followed an eye injury    Negative: Yellow or green pus in the eyes    Negative: SEVERE eye pain    Negative: Recent eye surgery and has increasing eye pain    Negative: Patient sounds very sick or weak to the triager    Negative: MODERATE eye pain or discomfort (e.g., interferes with normal activities or awakens from sleep; more than mild)    Negative: Looking at light causes MODERATE to SEVERE eye pain (i.e., photophobia)    Negative: New or worsening blurred vision    Negative: Cloudy spot or sore seen on the cornea (clear part of the eye)    Negative: Eyelids are very swollen (shut or almost)    Negative: Eyelid (outer) is very red    Negative: Vomiting    Negative: Foreign body sensation ('feels like something is in there')    Negative: Patient wants to be seen    Negative: Eye pain present > 24 hours    Negative: Bleeding on white of the eye and is taking Coumadin or known bleeding disorder (e.g., thrombocytopenia)    Negative: Only 1 eye is red, and persists > 48 hours    Negative: Bleeding on white of the eye    Negative: Red eyes present > 7 days    Negative: Red eye caused by sunscreen, smoke, smog, chlorine, food, soap or other mild irritant    Negative: Red eye caused by contact lens, and no eye pain  or blurred vision    Protocols used: EYE - RED WITHOUT PUS-A-OH

## 2024-05-01 ENCOUNTER — PATIENT OUTREACH (OUTPATIENT)
Dept: CARE COORDINATION | Facility: CLINIC | Age: 44
End: 2024-05-01

## 2024-05-15 ENCOUNTER — PATIENT OUTREACH (OUTPATIENT)
Dept: CARE COORDINATION | Facility: CLINIC | Age: 44
End: 2024-05-15

## 2024-07-14 ENCOUNTER — HEALTH MAINTENANCE LETTER (OUTPATIENT)
Age: 44
End: 2024-07-14

## 2024-10-30 ENCOUNTER — OFFICE VISIT (OUTPATIENT)
Dept: INTERNAL MEDICINE | Facility: CLINIC | Age: 44
End: 2024-10-30
Payer: COMMERCIAL

## 2024-10-30 VITALS
RESPIRATION RATE: 16 BRPM | WEIGHT: 156 LBS | SYSTOLIC BLOOD PRESSURE: 95 MMHG | DIASTOLIC BLOOD PRESSURE: 55 MMHG | OXYGEN SATURATION: 96 % | BODY MASS INDEX: 20.02 KG/M2 | HEART RATE: 62 BPM | TEMPERATURE: 97.1 F | HEIGHT: 74 IN

## 2024-10-30 DIAGNOSIS — Z11.59 NEED FOR HEPATITIS C SCREENING TEST: ICD-10-CM

## 2024-10-30 DIAGNOSIS — F90.9 ATTENTION DEFICIT HYPERACTIVITY DISORDER (ADHD), UNSPECIFIED ADHD TYPE: ICD-10-CM

## 2024-10-30 DIAGNOSIS — Z00.00 PREVENTATIVE HEALTH CARE: Primary | ICD-10-CM

## 2024-10-30 DIAGNOSIS — Z11.4 SCREENING FOR HIV (HUMAN IMMUNODEFICIENCY VIRUS): ICD-10-CM

## 2024-10-30 LAB
BASOPHILS # BLD AUTO: 0 10E3/UL (ref 0–0.2)
BASOPHILS NFR BLD AUTO: 0 %
EOSINOPHIL # BLD AUTO: 0.1 10E3/UL (ref 0–0.7)
EOSINOPHIL NFR BLD AUTO: 3 %
ERYTHROCYTE [DISTWIDTH] IN BLOOD BY AUTOMATED COUNT: 12.5 % (ref 10–15)
HCT VFR BLD AUTO: 40.5 % (ref 40–53)
HCV AB SERPL QL IA: NONREACTIVE
HGB BLD-MCNC: 13.7 G/DL (ref 13.3–17.7)
HIV 1+2 AB+HIV1 P24 AG SERPL QL IA: NONREACTIVE
IMM GRANULOCYTES # BLD: 0 10E3/UL
IMM GRANULOCYTES NFR BLD: 0 %
LYMPHOCYTES # BLD AUTO: 1.9 10E3/UL (ref 0.8–5.3)
LYMPHOCYTES NFR BLD AUTO: 38 %
MCH RBC QN AUTO: 29.7 PG (ref 26.5–33)
MCHC RBC AUTO-ENTMCNC: 33.8 G/DL (ref 31.5–36.5)
MCV RBC AUTO: 88 FL (ref 78–100)
MONOCYTES # BLD AUTO: 0.4 10E3/UL (ref 0–1.3)
MONOCYTES NFR BLD AUTO: 9 %
NEUTROPHILS # BLD AUTO: 2.4 10E3/UL (ref 1.6–8.3)
NEUTROPHILS NFR BLD AUTO: 50 %
PLATELET # BLD AUTO: 186 10E3/UL (ref 150–450)
RBC # BLD AUTO: 4.61 10E6/UL (ref 4.4–5.9)
WBC # BLD AUTO: 4.9 10E3/UL (ref 4–11)

## 2024-10-30 PROCEDURE — 84443 ASSAY THYROID STIM HORMONE: CPT | Performed by: INTERNAL MEDICINE

## 2024-10-30 PROCEDURE — 85025 COMPLETE CBC W/AUTO DIFF WBC: CPT | Performed by: INTERNAL MEDICINE

## 2024-10-30 PROCEDURE — 36415 COLL VENOUS BLD VENIPUNCTURE: CPT | Performed by: INTERNAL MEDICINE

## 2024-10-30 PROCEDURE — 99396 PREV VISIT EST AGE 40-64: CPT | Performed by: INTERNAL MEDICINE

## 2024-10-30 PROCEDURE — 99212 OFFICE O/P EST SF 10 MIN: CPT | Mod: 25 | Performed by: INTERNAL MEDICINE

## 2024-10-30 PROCEDURE — 87389 HIV-1 AG W/HIV-1&-2 AB AG IA: CPT | Performed by: INTERNAL MEDICINE

## 2024-10-30 PROCEDURE — 80053 COMPREHEN METABOLIC PANEL: CPT | Performed by: INTERNAL MEDICINE

## 2024-10-30 PROCEDURE — 86803 HEPATITIS C AB TEST: CPT | Performed by: INTERNAL MEDICINE

## 2024-10-30 PROCEDURE — 80061 LIPID PANEL: CPT | Performed by: INTERNAL MEDICINE

## 2024-10-30 SDOH — HEALTH STABILITY: PHYSICAL HEALTH: ON AVERAGE, HOW MANY DAYS PER WEEK DO YOU ENGAGE IN MODERATE TO STRENUOUS EXERCISE (LIKE A BRISK WALK)?: 1 DAY

## 2024-10-30 SDOH — HEALTH STABILITY: PHYSICAL HEALTH: ON AVERAGE, HOW MANY MINUTES DO YOU ENGAGE IN EXERCISE AT THIS LEVEL?: 20 MIN

## 2024-10-30 ASSESSMENT — SOCIAL DETERMINANTS OF HEALTH (SDOH): HOW OFTEN DO YOU GET TOGETHER WITH FRIENDS OR RELATIVES?: ONCE A WEEK

## 2024-10-30 NOTE — PROGRESS NOTES
Preventive Care Visit  [facility]  Ashly Feng MD, Internal Medicine  Oct 30, 2024      Assessment & Plan     Preventative health care     - CBC with platelets and differential; Future  - TSH with free T4 reflex; Future  - Comprehensive metabolic panel (BMP + Alb, Alk Phos, ALT, AST, Total. Bili, TP); Future  - Lipid Profile (Chol, Trig, HDL, LDL calc); Future  - CBC with platelets and differential  - TSH with free T4 reflex  - Comprehensive metabolic panel (BMP + Alb, Alk Phos, ALT, AST, Total. Bili, TP)  - Lipid Profile (Chol, Trig, HDL, LDL calc)    Screening for HIV (human immunodeficiency virus)     - HIV Antigen Antibody Combo; Future  - HIV Antigen Antibody Combo    Need for hepatitis C screening test     - Hepatitis C Screen Reflex to HCV RNA Quant and Genotype; Future  - Hepatitis C Screen Reflex to HCV RNA Quant and Genotype    Attention deficit hyperactivity disorder (ADHD), unspecified ADHD type     - Adult Mental Health  Referral; Future    Patient has been advised of split billing requirements and indicates understanding: Yes        Counseling  Appropriate preventive services were addressed with this patient via screening, questionnaire, or discussion as appropriate for fall prevention, nutrition, physical activity, Tobacco-use cessation, social engagement, weight loss and cognition.  Checklist reviewing preventive services available has been given to the patient.  Reviewed patient's diet, addressing concerns and/or questions.   He is at risk for lack of exercise and has been provided with information to increase physical activity for the benefit of his well-being.   He is at risk for psychosocial distress and has been provided with information to reduce risk.           George Barnes is a 43 year old, presenting for the following:  Physical        10/30/2024     7:32 AM   Additional Questions   Roomed by DOUG Montano LPN   Accompanied by kids          10/30/2024     7:32 AM   Patient Reported Additional Medications   Patient reports taking the following new medications none          HPI      Health Care Directive  Patient does not have a Health Care Directive: Discussed advance care planning with patient; information given to patient to review.      10/30/2024   General Health   How would you rate your overall physical health? Good   Feel stress (tense, anxious, or unable to sleep) Only a little      (!) STRESS CONCERN      10/30/2024   Nutrition   Three or more servings of calcium each day? Yes   Diet: Regular (no restrictions)   How many servings of fruit and vegetables per day? (!) 2-3   How many sweetened beverages each day? 0-1            10/30/2024   Exercise   Days per week of moderate/strenous exercise 1 day   Average minutes spent exercising at this level 20 min      (!) EXERCISE CONCERN      10/30/2024   Social Factors   Frequency of gathering with friends or relatives Once a week   Worry food won't last until get money to buy more No   Food not last or not have enough money for food? No   Do you have housing? (Housing is defined as stable permanent housing and does not include staying ouside in a car, in a tent, in an abandoned building, in an overnight shelter, or couch-surfing.) Yes   Are you worried about losing your housing? No   Lack of transportation? No   Unable to get utilities (heat,electricity)? No            10/30/2024   Dental   Dentist two times every year? Yes            10/30/2024   TB Screening   Were you born outside of the [place]? No            Today's PHQ-2 Score:       10/30/2024     7:28 AM   PHQ-2 ( 1999 [employer])   Q1: Little interest or pleasure in doing things 0    Q2: Feeling down, depressed or hopeless 0    PHQ-2 Score 0    Q1: Little interest or pleasure in doing things Not at all   Q2: Feeling down, depressed or hopeless Not at all   PHQ-2 Score 0       Patient-reported           10/30/2024   Substance Use   Alcohol more than  3/day or more than 7/wk No   Do you use any other substances recreationally? No        Social History     Tobacco Use    Smoking status: Never    Smokeless tobacco: Never   Vaping Use    Vaping status: Never Used   Substance Use Topics    Alcohol use: Yes     Comment: rare     Drug use: No             10/30/2024   One time HIV Screening   Previous HIV test? Decline          10/30/2024   STI Screening   New sexual partner(s) since last STI/HIV test? (!) DECLINE      ASCVD Risk   The 10-year ASCVD risk score (Moon SECOBAR, et al., 2019) is: 0.9%    Values used to calculate the score:      Age: 43 years      Sex: Male      Is Non- : No      Diabetic: No      Tobacco smoker: No      Systolic Blood Pressure: 95 mmHg      Is BP treated: No      HDL Cholesterol: 41 mg/dL      Total Cholesterol: 161 mg/dL        10/30/2024   Contraception/Family Planning   Questions about contraception or family planning No           Reviewed and updated as needed this visit by Provider                    Past Medical History:   Diagnosis Date    Family history of colorectal cancer      Past Surgical History:   Procedure Laterality Date    AS REMOVAL OF TONSILS,<10 Y/O      COLONOSCOPY N/A 11/16/2020    Procedure: COLONOSCOPY, WITH POLYPECTOMY AND BIOPSY;  Surgeon: Mary Jane Daugherty MD;  Location:  GI         Review of Systems  CONSTITUTIONAL: NEGATIVE for fever, chills, change in weight  INTEGUMENTARY/SKIN: NEGATIVE for worrisome rashes, moles or lesions  EYES: NEGATIVE for vision changes or irritation  ENT/MOUTH: NEGATIVE for ear, mouth and throat problems  RESP: NEGATIVE for significant cough or SOB  BREAST: NEGATIVE for masses, tenderness or discharge  CV: NEGATIVE for chest pain, palpitations or peripheral edema  GI: NEGATIVE for nausea, abdominal pain, heartburn, or change in bowel habits  : NEGATIVE for frequency, dysuria, or hematuria  MUSCULOSKELETAL: NEGATIVE for significant arthralgias or  "myalgia  NEURO: NEGATIVE for weakness, dizziness or paresthesias  ENDOCRINE: NEGATIVE for temperature intolerance, skin/hair changes  HEME: NEGATIVE for bleeding problems  PSYCHIATRIC: he would like to be evaluated for ADD he has troubel to focus and remember things.      Objective    Exam  BP 95/55   Pulse 62   Temp 97.1  F (36.2  C) (Oral)   Resp 16   Ht 1.88 m (6' 2\")   Wt 70.8 kg (156 lb)   SpO2 96%   BMI 20.03 kg/m     Estimated body mass index is 20.03 kg/m  as calculated from the following:    Height as of this encounter: 1.88 m (6' 2\").    Weight as of this encounter: 70.8 kg (156 lb).    Physical Exam  GENERAL: alert and no distress  EYES: Eyes grossly normal to inspection, PERRL and conjunctivae and sclerae normal  HENT: ear canals and TM's normal, nose and mouth without ulcers or lesions  NECK: no adenopathy, no asymmetry, masses, or scars  RESP: lungs clear to auscultation - no rales, rhonchi or wheezes  CV: regular rate and rhythm, normal S1 S2, no S3 or S4, no murmur, click or rub, no peripheral edema  ABDOMEN: soft, nontender, no hepatosplenomegaly, no masses and bowel sounds normal  MS: no gross musculoskeletal defects noted, no edema  SKIN: no suspicious lesions or rashes  NEURO: Normal strength and tone, mentation intact and speech normal  PSYCH: mentation appears normal, affect normal/bright        Signed Electronically by: Ashly Feng MD    "

## 2024-10-31 LAB
ALBUMIN SERPL BCG-MCNC: 4.4 G/DL (ref 3.5–5.2)
ALP SERPL-CCNC: 53 U/L (ref 40–150)
ALT SERPL W P-5'-P-CCNC: 12 U/L (ref 0–70)
ANION GAP SERPL CALCULATED.3IONS-SCNC: 11 MMOL/L (ref 7–15)
AST SERPL W P-5'-P-CCNC: 20 U/L (ref 0–45)
BILIRUB SERPL-MCNC: 0.4 MG/DL
BUN SERPL-MCNC: 21.5 MG/DL (ref 6–20)
CALCIUM SERPL-MCNC: 9.4 MG/DL (ref 8.8–10.4)
CHLORIDE SERPL-SCNC: 101 MMOL/L (ref 98–107)
CHOLEST SERPL-MCNC: 170 MG/DL
CREAT SERPL-MCNC: 1.37 MG/DL (ref 0.67–1.17)
EGFRCR SERPLBLD CKD-EPI 2021: 65 ML/MIN/1.73M2
FASTING STATUS PATIENT QL REPORTED: YES
FASTING STATUS PATIENT QL REPORTED: YES
GLUCOSE SERPL-MCNC: 86 MG/DL (ref 70–99)
HCO3 SERPL-SCNC: 27 MMOL/L (ref 22–29)
HDLC SERPL-MCNC: 44 MG/DL
LDLC SERPL CALC-MCNC: 105 MG/DL
NONHDLC SERPL-MCNC: 126 MG/DL
POTASSIUM SERPL-SCNC: 4 MMOL/L (ref 3.4–5.3)
PROT SERPL-MCNC: 7.1 G/DL (ref 6.4–8.3)
SODIUM SERPL-SCNC: 139 MMOL/L (ref 135–145)
TRIGL SERPL-MCNC: 103 MG/DL
TSH SERPL DL<=0.005 MIU/L-ACNC: 2.41 UIU/ML (ref 0.3–4.2)

## (undated) RX ORDER — FENTANYL CITRATE 50 UG/ML
INJECTION, SOLUTION INTRAMUSCULAR; INTRAVENOUS
Status: DISPENSED
Start: 2020-11-16